# Patient Record
Sex: FEMALE | Race: WHITE | NOT HISPANIC OR LATINO | Employment: OTHER | URBAN - METROPOLITAN AREA
[De-identification: names, ages, dates, MRNs, and addresses within clinical notes are randomized per-mention and may not be internally consistent; named-entity substitution may affect disease eponyms.]

---

## 2024-08-10 ENCOUNTER — HOSPITAL ENCOUNTER (EMERGENCY)
Facility: HOSPITAL | Age: 89
Discharge: HOME | End: 2024-08-10
Attending: EMERGENCY MEDICINE | Admitting: EMERGENCY MEDICINE
Payer: MEDICARE

## 2024-08-10 ENCOUNTER — APPOINTMENT (EMERGENCY)
Dept: CT IMAGING | Facility: HOSPITAL | Age: 89
End: 2024-08-10
Payer: MEDICARE

## 2024-08-10 VITALS
OXYGEN SATURATION: 93 % | WEIGHT: 151 LBS | SYSTOLIC BLOOD PRESSURE: 131 MMHG | HEART RATE: 71 BPM | DIASTOLIC BLOOD PRESSURE: 79 MMHG | TEMPERATURE: 98 F | RESPIRATION RATE: 18 BRPM

## 2024-08-10 DIAGNOSIS — S51.819A SKIN TEAR OF FOREARM WITHOUT COMPLICATION: ICD-10-CM
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
## 2024-08-10 PROCEDURE — 70450 CT HEAD/BRAIN W/O DYE: CPT

## 2024-08-10 PROCEDURE — 99284 EMERGENCY DEPT VISIT MOD MDM: CPT

## 2024-08-10 NOTE — ED PROVIDER NOTES
History  Chief Complaint   Patient presents with    Fall     Pt tripped and fell 2 days ago and c.o laceration to right arm, +BT, +head strike      88-year-old female presents ER for evaluation of fall.  Patient stated that she fell 2 days ago.  Patient has a skin tear to her right lateral forearm.  Patient lives with family who states that she was changing the dressing today and was gushing blood.  Patient did fall and hit her head she is on blood thinners.  No noted loss of consciousness.  Patient has full range of motion to her arm and only concern is the skin tear.  Patient neurovascularly intact, sensation equal bilaterally.    GENERAL APPEARANCE: Patient in no acute distress.  HEENT: NCAT; PERRL, EOMs intact; Mucous membranes moist  NECK / BACK: Nontender  CV: Regular rate and rhythm; no murmur/gallops/rubs appreciated.  CHEST / LUNGS: Clear to auscultation; no wheezes/rales/rhonci.  ABD: NABS; soft; non-distended; non-tender.  EXT: +2 pulses bilaterally upper & lower extremities; no edema.  NEURO: GCS 15; no focal neurologic deficits; neurovascularly intact.  SKIN: Warm, dry and well perfused; no rash; no jaundice.      History provided by:  Patient      None       Past Medical History:   Diagnosis Date    Hypertension     Stroke (HCC)        History reviewed. No pertinent surgical history.    History reviewed. No pertinent family history.  I have reviewed and agree with the history as documented.    E-Cigarette/Vaping     E-Cigarette/Vaping Substances     Social History     Tobacco Use    Smoking status: Never    Smokeless tobacco: Never       Review of Systems   Constitutional:  Negative for chills and fever.   HENT:  Negative for ear pain and sore throat.    Eyes:  Negative for pain and visual disturbance.   Respiratory:  Negative for cough and shortness of breath.    Cardiovascular:  Negative for chest pain and palpitations.   Gastrointestinal:  Negative for abdominal pain, diarrhea and vomiting.    Genitourinary:  Negative for dysuria and hematuria.   Musculoskeletal:  Negative for arthralgias and back pain.   Skin:  Positive for wound. Negative for color change and rash.   Neurological:  Positive for headaches. Negative for seizures and syncope.   All other systems reviewed and are negative.      Physical Exam  Physical Exam  Vitals and nursing note reviewed.   Constitutional:       General: She is not in acute distress.     Appearance: She is well-developed.   HENT:      Head: Normocephalic and atraumatic.      Right Ear: External ear normal.      Left Ear: External ear normal.   Eyes:      Conjunctiva/sclera: Conjunctivae normal.   Cardiovascular:      Rate and Rhythm: Normal rate and regular rhythm.      Pulses: Normal pulses.      Heart sounds: Normal heart sounds. No murmur heard.  Pulmonary:      Effort: Pulmonary effort is normal. No respiratory distress.      Breath sounds: Normal breath sounds.   Abdominal:      Palpations: Abdomen is soft.   Musculoskeletal:         General: No swelling.      Cervical back: Neck supple.   Skin:     General: Skin is warm and dry.      Capillary Refill: Capillary refill takes less than 2 seconds.          Neurological:      Mental Status: She is alert and oriented to person, place, and time. Mental status is at baseline.      Cranial Nerves: No cranial nerve deficit.      Sensory: No sensory deficit.      Motor: No weakness.      Coordination: Coordination normal.   Psychiatric:         Mood and Affect: Mood normal.         Behavior: Behavior normal.         Vital Signs  ED Triage Vitals [08/10/24 1214]   Temperature Pulse Respirations Blood Pressure SpO2   98 °F (36.7 °C) (!) 110 18 135/78 93 %      Temp Source Heart Rate Source Patient Position - Orthostatic VS BP Location FiO2 (%)   Tympanic Monitor Sitting Left arm --      Pain Score       --           Vitals:    08/10/24 1214   BP: 135/78   Pulse: (!) 110   Patient Position - Orthostatic VS: Sitting          Visual Acuity  Visual Acuity      Flowsheet Row Most Recent Value   L Pupil Size (mm) 3   R Pupil Size (mm) 3            ED Medications  Medications - No data to display    Diagnostic Studies  Results Reviewed       None                   CT head wo contrast    (Results Pending)              Procedures  Procedures         ED Course  ED Course as of 08/10/24 1344   Sat Aug 10, 2024   1319 CT head wo contrast  No acute intracranial abnormality.                                                    Medical Decision Making  Given workup, exam and history low suspicion for intracranial hemorrhage or trauma, with carotid or vertebral artery dissection, intrathoracic trauma(pulmonary contusion, blunt cardiac trauma, pneumothorax, hemothorax), intra-abdominal trauma (no liver, spleen or renal lacerations, doubt hollow visceral injury), extremity fracture, extremity dislocation, compartment syndrome.  CT head negative.  Skin tear dressed and wound care discussed of right forearm.  Advised to follow-up with primary care provider.  Return to the ER symptoms worsens or questions or concerns arise at home.      Amount and/or Complexity of Data Reviewed  Radiology: ordered. Decision-making details documented in ED Course.                 Disposition  Final diagnoses:   None     ED Disposition       None          Follow-up Information    None         Patient's Medications    No medications on file       No discharge procedures on file.    PDMP Review       None            ED Provider  Electronically Signed by             BERKLEY Carey  08/10/24 0809

## 2024-08-10 NOTE — DISCHARGE INSTRUCTIONS
Keep wound clean and dry  Steri-strips will come off on its own  Follow up with primary care provider as needed  Return to ER if symptoms worsen

## 2025-01-20 ENCOUNTER — APPOINTMENT (OUTPATIENT)
Dept: RADIOLOGY | Facility: HOSPITAL | Age: 89
DRG: 072 | End: 2025-01-20
Payer: MEDICARE

## 2025-01-20 ENCOUNTER — APPOINTMENT (EMERGENCY)
Dept: CT IMAGING | Facility: HOSPITAL | Age: 89
DRG: 072 | End: 2025-01-20
Payer: MEDICARE

## 2025-01-20 ENCOUNTER — HOSPITAL ENCOUNTER (INPATIENT)
Facility: HOSPITAL | Age: 89
LOS: 3 days | Discharge: NURSING, REHAB OR LONG-TERM CARE | DRG: 072 | End: 2025-01-24
Attending: EMERGENCY MEDICINE | Admitting: INTERNAL MEDICINE
Payer: MEDICARE

## 2025-01-20 DIAGNOSIS — R51.9 ACUTE HEADACHE: ICD-10-CM

## 2025-01-20 DIAGNOSIS — R42 DIZZINESS: Primary | ICD-10-CM

## 2025-01-20 DIAGNOSIS — R29.90 STROKE-LIKE SYMPTOMS: ICD-10-CM

## 2025-01-20 DIAGNOSIS — R07.9 CHEST PAIN: ICD-10-CM

## 2025-01-20 PROBLEM — E87.6 HYPOKALEMIA: Status: ACTIVE | Noted: 2025-01-20

## 2025-01-20 PROBLEM — F32.9 MDD (MAJOR DEPRESSIVE DISORDER): Status: ACTIVE | Noted: 2025-01-20

## 2025-01-20 PROBLEM — I10 HTN (HYPERTENSION): Status: ACTIVE | Noted: 2025-01-20

## 2025-01-20 LAB
ANION GAP SERPL CALCULATED.3IONS-SCNC: 6 MMOL/L (ref 4–13)
APTT PPP: 24 SECONDS (ref 23–34)
BUN SERPL-MCNC: 14 MG/DL (ref 5–25)
CALCIUM SERPL-MCNC: 8.3 MG/DL (ref 8.4–10.2)
CARDIAC TROPONIN I PNL SERPL HS: 5 NG/L (ref ?–50)
CHLORIDE SERPL-SCNC: 105 MMOL/L (ref 96–108)
CO2 SERPL-SCNC: 26 MMOL/L (ref 21–32)
CREAT SERPL-MCNC: 0.48 MG/DL (ref 0.6–1.3)
ERYTHROCYTE [DISTWIDTH] IN BLOOD BY AUTOMATED COUNT: 13.2 % (ref 11.6–15.1)
GFR SERPL CREATININE-BSD FRML MDRD: 88 ML/MIN/1.73SQ M
GLUCOSE SERPL-MCNC: 115 MG/DL (ref 65–140)
GLUCOSE SERPL-MCNC: 122 MG/DL (ref 65–140)
HCT VFR BLD AUTO: 36.6 % (ref 34.8–46.1)
HGB BLD-MCNC: 12 G/DL (ref 11.5–15.4)
INR PPP: 1 (ref 0.85–1.19)
MCH RBC QN AUTO: 29.8 PG (ref 26.8–34.3)
MCHC RBC AUTO-ENTMCNC: 32.8 G/DL (ref 31.4–37.4)
MCV RBC AUTO: 91 FL (ref 82–98)
PLATELET # BLD AUTO: 217 THOUSANDS/UL (ref 149–390)
PMV BLD AUTO: 9.2 FL (ref 8.9–12.7)
POTASSIUM SERPL-SCNC: 3.4 MMOL/L (ref 3.5–5.3)
PROTHROMBIN TIME: 14 SECONDS (ref 12.3–15)
RBC # BLD AUTO: 4.03 MILLION/UL (ref 3.81–5.12)
SODIUM SERPL-SCNC: 137 MMOL/L (ref 135–147)
WBC # BLD AUTO: 7.3 THOUSAND/UL (ref 4.31–10.16)

## 2025-01-20 PROCEDURE — 84484 ASSAY OF TROPONIN QUANT: CPT | Performed by: EMERGENCY MEDICINE

## 2025-01-20 PROCEDURE — 96375 TX/PRO/DX INJ NEW DRUG ADDON: CPT

## 2025-01-20 PROCEDURE — 85730 THROMBOPLASTIN TIME PARTIAL: CPT | Performed by: EMERGENCY MEDICINE

## 2025-01-20 PROCEDURE — 99285 EMERGENCY DEPT VISIT HI MDM: CPT

## 2025-01-20 PROCEDURE — 93005 ELECTROCARDIOGRAM TRACING: CPT

## 2025-01-20 PROCEDURE — 82948 REAGENT STRIP/BLOOD GLUCOSE: CPT

## 2025-01-20 PROCEDURE — 96361 HYDRATE IV INFUSION ADD-ON: CPT

## 2025-01-20 PROCEDURE — 71045 X-RAY EXAM CHEST 1 VIEW: CPT

## 2025-01-20 PROCEDURE — 80048 BASIC METABOLIC PNL TOTAL CA: CPT | Performed by: EMERGENCY MEDICINE

## 2025-01-20 PROCEDURE — 70498 CT ANGIOGRAPHY NECK: CPT

## 2025-01-20 PROCEDURE — 85027 COMPLETE CBC AUTOMATED: CPT | Performed by: EMERGENCY MEDICINE

## 2025-01-20 PROCEDURE — 70496 CT ANGIOGRAPHY HEAD: CPT

## 2025-01-20 PROCEDURE — 36415 COLL VENOUS BLD VENIPUNCTURE: CPT | Performed by: EMERGENCY MEDICINE

## 2025-01-20 PROCEDURE — 99223 1ST HOSP IP/OBS HIGH 75: CPT | Performed by: STUDENT IN AN ORGANIZED HEALTH CARE EDUCATION/TRAINING PROGRAM

## 2025-01-20 PROCEDURE — 99285 EMERGENCY DEPT VISIT HI MDM: CPT | Performed by: EMERGENCY MEDICINE

## 2025-01-20 PROCEDURE — 85610 PROTHROMBIN TIME: CPT | Performed by: EMERGENCY MEDICINE

## 2025-01-20 PROCEDURE — 96374 THER/PROPH/DIAG INJ IV PUSH: CPT

## 2025-01-20 RX ORDER — ATORVASTATIN CALCIUM 40 MG/1
40 TABLET, FILM COATED ORAL EVERY EVENING
Status: DISCONTINUED | OUTPATIENT
Start: 2025-01-20 | End: 2025-01-20

## 2025-01-20 RX ORDER — POTASSIUM CHLORIDE 1500 MG/1
20 TABLET, EXTENDED RELEASE ORAL ONCE
Status: DISCONTINUED | OUTPATIENT
Start: 2025-01-20 | End: 2025-01-20

## 2025-01-20 RX ORDER — FLUOXETINE 10 MG/1
10 CAPSULE ORAL DAILY
COMMUNITY

## 2025-01-20 RX ORDER — ATORVASTATIN CALCIUM 40 MG/1
40 TABLET, FILM COATED ORAL DAILY
Status: DISCONTINUED | OUTPATIENT
Start: 2025-01-21 | End: 2025-01-24 | Stop reason: HOSPADM

## 2025-01-20 RX ORDER — CLOPIDOGREL BISULFATE 75 MG/1
75 TABLET ORAL DAILY
COMMUNITY

## 2025-01-20 RX ORDER — ATORVASTATIN CALCIUM 40 MG/1
40 TABLET, FILM COATED ORAL DAILY
COMMUNITY

## 2025-01-20 RX ORDER — ACETAMINOPHEN 325 MG/1
975 TABLET ORAL ONCE
Status: DISCONTINUED | OUTPATIENT
Start: 2025-01-20 | End: 2025-01-20

## 2025-01-20 RX ORDER — ACETAMINOPHEN 10 MG/ML
1000 INJECTION, SOLUTION INTRAVENOUS EVERY 6 HOURS PRN
Status: DISCONTINUED | OUTPATIENT
Start: 2025-01-20 | End: 2025-01-23

## 2025-01-20 RX ORDER — FENTANYL CITRATE 50 UG/ML
50 INJECTION, SOLUTION INTRAMUSCULAR; INTRAVENOUS ONCE
Refills: 0 | Status: COMPLETED | OUTPATIENT
Start: 2025-01-20 | End: 2025-01-20

## 2025-01-20 RX ORDER — MULTIVITAMIN
1 TABLET ORAL DAILY
COMMUNITY

## 2025-01-20 RX ORDER — MECLIZINE HCL 12.5 MG 12.5 MG/1
12.5 TABLET ORAL ONCE
Status: COMPLETED | OUTPATIENT
Start: 2025-01-20 | End: 2025-01-20

## 2025-01-20 RX ORDER — CLOPIDOGREL BISULFATE 75 MG/1
75 TABLET ORAL DAILY
Status: DISCONTINUED | OUTPATIENT
Start: 2025-01-21 | End: 2025-01-24 | Stop reason: HOSPADM

## 2025-01-20 RX ORDER — ASPIRIN 325 MG
325 TABLET ORAL ONCE
Status: COMPLETED | OUTPATIENT
Start: 2025-01-20 | End: 2025-01-20

## 2025-01-20 RX ORDER — METOCLOPRAMIDE HYDROCHLORIDE 5 MG/ML
10 INJECTION INTRAMUSCULAR; INTRAVENOUS ONCE
Status: COMPLETED | OUTPATIENT
Start: 2025-01-20 | End: 2025-01-20

## 2025-01-20 RX ORDER — LIDOCAINE 50 MG/G
1 PATCH TOPICAL DAILY
Status: DISCONTINUED | OUTPATIENT
Start: 2025-01-20 | End: 2025-01-21

## 2025-01-20 RX ORDER — AMLODIPINE BESYLATE 5 MG/1
5 TABLET ORAL DAILY
COMMUNITY

## 2025-01-20 RX ORDER — ENOXAPARIN SODIUM 100 MG/ML
40 INJECTION SUBCUTANEOUS DAILY
Status: DISCONTINUED | OUTPATIENT
Start: 2025-01-21 | End: 2025-01-24 | Stop reason: HOSPADM

## 2025-01-20 RX ADMIN — LIDOCAINE 1 PATCH: 50 PATCH CUTANEOUS at 22:12

## 2025-01-20 RX ADMIN — SODIUM CHLORIDE 500 ML: 0.9 INJECTION, SOLUTION INTRAVENOUS at 18:33

## 2025-01-20 RX ADMIN — ASPIRIN 325 MG ORAL TABLET 325 MG: 325 PILL ORAL at 19:14

## 2025-01-20 RX ADMIN — MECLIZINE HCL 12.5 MG 12.5 MG: 12.5 TABLET ORAL at 18:27

## 2025-01-20 RX ADMIN — FENTANYL CITRATE 50 MCG: 0.05 INJECTION, SOLUTION INTRAMUSCULAR; INTRAVENOUS at 18:45

## 2025-01-20 RX ADMIN — METOCLOPRAMIDE HYDROCHLORIDE 10 MG: 5 INJECTION INTRAMUSCULAR; INTRAVENOUS at 18:27

## 2025-01-20 NOTE — QUICK NOTE
Neurology Stroke Alert Documentation    Stroke alert called at 1747 (10-15 minutes pre-hospital), neurology response was immediate. Discussed case with ED over phone. History and examination per ED.    Ibeth Burrell is an 89 year old woman who presented to the hospital for evaluation of dizziness and N/V. Around 1630, pt began to have dizziness in addition to N/V and chest pain. Symptoms were persistent, which is why she came to the hospital for evaluation. Has chronic RUE weakness from a prior stroke, at baseline. On Plavix at home. No other noted symptoms. Reported to have significant improvement in symptoms after return from CT scan.   - NIHSS 1 (has residual RUE drift from prior stroke, at baseline)  - LKW 1630    Discussed with Radiology:  CTH: no acute intracranial abnormality  CTA H/N: no LVO    TNK deferred given much improvement with mild/non-disabling symptoms, with potential risks outweighing benefits.    Not an endovascular candidate with no LVO/IR target.    Pt with much improvement in symptoms after return from CT scan. Noted to have mild RUE drift which is her baseline. Symptoms may potentially be peripheral in nature but in light of risk factors for stroke, would recommend admission for MRI brain and further evaluation. Load with aspirin 325 mg once. Start aspirin 81 mg daily and Plavix 75 mg daily starting tomorrow for now pending further workup. Can allow for systolic BP up to 200 systolic for now. Stroke pathway.

## 2025-01-21 ENCOUNTER — APPOINTMENT (INPATIENT)
Dept: MRI IMAGING | Facility: HOSPITAL | Age: 89
DRG: 072 | End: 2025-01-21
Payer: MEDICARE

## 2025-01-21 ENCOUNTER — APPOINTMENT (OUTPATIENT)
Dept: NON INVASIVE DIAGNOSTICS | Facility: HOSPITAL | Age: 89
DRG: 072 | End: 2025-01-21
Payer: MEDICARE

## 2025-01-21 LAB
ANION GAP SERPL CALCULATED.3IONS-SCNC: 6 MMOL/L (ref 4–13)
AORTIC ROOT: 3.9 CM
AORTIC VALVE MEAN VELOCITY: 19.8 M/S
ASCENDING AORTA: 4.2 CM
ATRIAL RATE: 62 BPM
ATRIAL RATE: 63 BPM
ATRIAL RATE: 65 BPM
AV AREA BY CONTINUOUS VTI: 2.9 CM2
AV AREA PEAK VELOCITY: 2.5 CM2
AV LVOT MEAN GRADIENT: 5 MMHG
AV LVOT PEAK GRADIENT: 8 MMHG
AV MEAN PRESS GRAD SYS DOP V1V2: 18 MMHG
AV ORIFICE AREA US: 2.9 CM2
AV PEAK GRADIENT: 30 MMHG
AV REGURGITATION PRESSURE HALF TIME: 385 MS
AV VELOCITY RATIO: 0.59
BASOPHILS # BLD AUTO: 0.03 THOUSANDS/ΜL (ref 0–0.1)
BASOPHILS NFR BLD AUTO: 0 % (ref 0–1)
BSA FOR ECHO PROCEDURE: 1.75 M2
BUN SERPL-MCNC: 14 MG/DL (ref 5–25)
CALCIUM SERPL-MCNC: 8.7 MG/DL (ref 8.4–10.2)
CHLORIDE SERPL-SCNC: 109 MMOL/L (ref 96–108)
CHOLEST SERPL-MCNC: 131 MG/DL (ref ?–200)
CO2 SERPL-SCNC: 26 MMOL/L (ref 21–32)
CREAT SERPL-MCNC: 0.42 MG/DL (ref 0.6–1.3)
DOP CALC AO VTI: 65.7 CM
DOP CALC LVOT AREA: 4.91
DOP CALC LVOT DIAMETER: 2.5 CM
DOP CALC LVOT PEAK VEL VTI: 38.8 CM
DOP CALC LVOT PEAK VEL: 1.39 M/S
DOP CALC LVOT STROKE INDEX: 109.1 ML/M2
DOP CALC LVOT STROKE VOLUME: 190.36
DOP CALC MV VTI: 45.5 CM
E WAVE DECELERATION TIME: 454 MS
E/A RATIO: 0.61
EOSINOPHIL # BLD AUTO: 0.25 THOUSAND/ΜL (ref 0–0.61)
EOSINOPHIL NFR BLD AUTO: 3 % (ref 0–6)
ERYTHROCYTE [DISTWIDTH] IN BLOOD BY AUTOMATED COUNT: 13 % (ref 11.6–15.1)
EST. AVERAGE GLUCOSE BLD GHB EST-MCNC: 108 MG/DL
FRACTIONAL SHORTENING: 35 (ref 28–44)
GFR SERPL CREATININE-BSD FRML MDRD: 92 ML/MIN/1.73SQ M
GLUCOSE P FAST SERPL-MCNC: 98 MG/DL (ref 65–99)
GLUCOSE SERPL-MCNC: 98 MG/DL (ref 65–140)
HBA1C MFR BLD: 5.4 %
HCT VFR BLD AUTO: 35.8 % (ref 34.8–46.1)
HDLC SERPL-MCNC: 48 MG/DL
HGB BLD-MCNC: 12 G/DL (ref 11.5–15.4)
IMM GRANULOCYTES # BLD AUTO: 0.02 THOUSAND/UL (ref 0–0.2)
IMM GRANULOCYTES NFR BLD AUTO: 0 % (ref 0–2)
INTERVENTRICULAR SEPTUM IN DIASTOLE (PARASTERNAL SHORT AXIS VIEW): 1.6 CM
INTERVENTRICULAR SEPTUM: 1.6 CM (ref 0.6–1.1)
LA/AORTA RATIO 2D: 1.44
LAAS-AP2: 17.7 CM2
LAAS-AP4: 22.1 CM2
LDLC SERPL CALC-MCNC: 63 MG/DL (ref 0–100)
LEFT ATRIUM SIZE: 5.6 CM
LEFT ATRIUM VOLUME (MOD BIPLANE): 58 ML
LEFT ATRIUM VOLUME INDEX (MOD BIPLANE): 33.1 ML/M2
LEFT INTERNAL DIMENSION IN SYSTOLE: 2.4 CM (ref 2.1–4)
LEFT VENTRICULAR INTERNAL DIMENSION IN DIASTOLE: 3.7 CM (ref 3.5–6)
LEFT VENTRICULAR POSTERIOR WALL IN END DIASTOLE: 1.1 CM
LEFT VENTRICULAR STROKE VOLUME: 36 ML
LV EF US.2D.A4C+ESTIMATED: 62 %
LVSV (TEICH): 36 ML
LYMPHOCYTES # BLD AUTO: 1.33 THOUSANDS/ΜL (ref 0.6–4.47)
LYMPHOCYTES NFR BLD AUTO: 18 % (ref 14–44)
MAGNESIUM SERPL-MCNC: 1.4 MG/DL (ref 1.9–2.7)
MCH RBC QN AUTO: 30.1 PG (ref 26.8–34.3)
MCHC RBC AUTO-ENTMCNC: 33.5 G/DL (ref 31.4–37.4)
MCV RBC AUTO: 90 FL (ref 82–98)
MONOCYTES # BLD AUTO: 0.68 THOUSAND/ΜL (ref 0.17–1.22)
MONOCYTES NFR BLD AUTO: 9 % (ref 4–12)
MV E'TISSUE VEL-SEP: 4 CM/S
MV MEAN GRADIENT: 3 MMHG
MV PEAK A VEL: 1.63 M/S
MV PEAK E VEL: 99 CM/S
MV PEAK GRADIENT: 10 MMHG
MV STENOSIS PRESSURE HALF TIME: 130 MS
MV VALVE AREA BY CONTINUITY EQUATION: 4.18 CM2
MV VALVE AREA P 1/2 METHOD: 1.69
NEUTROPHILS # BLD AUTO: 5.14 THOUSANDS/ΜL (ref 1.85–7.62)
NEUTS SEG NFR BLD AUTO: 70 % (ref 43–75)
NRBC BLD AUTO-RTO: 0 /100 WBCS
P AXIS: 50 DEGREES
P AXIS: 55 DEGREES
P AXIS: 56 DEGREES
PLATELET # BLD AUTO: 183 THOUSANDS/UL (ref 149–390)
PMV BLD AUTO: 9.2 FL (ref 8.9–12.7)
POTASSIUM SERPL-SCNC: 3.5 MMOL/L (ref 3.5–5.3)
PR INTERVAL: 148 MS
PR INTERVAL: 150 MS
PR INTERVAL: 156 MS
QRS AXIS: 34 DEGREES
QRS AXIS: 62 DEGREES
QRS AXIS: 66 DEGREES
QRSD INTERVAL: 80 MS
QRSD INTERVAL: 82 MS
QRSD INTERVAL: 90 MS
QT INTERVAL: 452 MS
QT INTERVAL: 462 MS
QT INTERVAL: 462 MS
QTC INTERVAL: 468 MS
QTC INTERVAL: 470 MS
QTC INTERVAL: 472 MS
RBC # BLD AUTO: 3.99 MILLION/UL (ref 3.81–5.12)
RIGHT VENTRICLE ID DIMENSION: 3 CM
SL CV AV PEAK GRADIENT RETROGRADE: 41 MMHG
SL CV ECHO LV DYNAMIC OBSTRUCTION PEAK GRADIENT (REST): 9 MMHG
SL CV LV EF: 60
SL CV PED ECHO LEFT VENTRICLE DIASTOLIC VOLUME (MOD BIPLANE) 2D: 57 ML
SL CV PED ECHO LEFT VENTRICLE SYSTOLIC VOLUME (MOD BIPLANE) 2D: 20 ML
SODIUM SERPL-SCNC: 141 MMOL/L (ref 135–147)
T WAVE AXIS: 63 DEGREES
T WAVE AXIS: 77 DEGREES
T WAVE AXIS: 82 DEGREES
TR MAX PG: 25 MMHG
TR PEAK VELOCITY: 2.5 M/S
TRICUSPID ANNULAR PLANE SYSTOLIC EXCURSION: 2.1 CM
TRICUSPID VALVE PEAK REGURGITATION VELOCITY: 2.48 M/S
TRIGL SERPL-MCNC: 102 MG/DL (ref ?–150)
VENTRICULAR RATE: 62 BPM
VENTRICULAR RATE: 63 BPM
VENTRICULAR RATE: 65 BPM
WBC # BLD AUTO: 7.45 THOUSAND/UL (ref 4.31–10.16)

## 2025-01-21 PROCEDURE — 97110 THERAPEUTIC EXERCISES: CPT

## 2025-01-21 PROCEDURE — 93306 TTE W/DOPPLER COMPLETE: CPT | Performed by: STUDENT IN AN ORGANIZED HEALTH CARE EDUCATION/TRAINING PROGRAM

## 2025-01-21 PROCEDURE — 83036 HEMOGLOBIN GLYCOSYLATED A1C: CPT | Performed by: STUDENT IN AN ORGANIZED HEALTH CARE EDUCATION/TRAINING PROGRAM

## 2025-01-21 PROCEDURE — 97162 PT EVAL MOD COMPLEX 30 MIN: CPT

## 2025-01-21 PROCEDURE — 93010 ELECTROCARDIOGRAM REPORT: CPT | Performed by: INTERNAL MEDICINE

## 2025-01-21 PROCEDURE — 97167 OT EVAL HIGH COMPLEX 60 MIN: CPT

## 2025-01-21 PROCEDURE — 80061 LIPID PANEL: CPT | Performed by: STUDENT IN AN ORGANIZED HEALTH CARE EDUCATION/TRAINING PROGRAM

## 2025-01-21 PROCEDURE — 70551 MRI BRAIN STEM W/O DYE: CPT

## 2025-01-21 PROCEDURE — 83735 ASSAY OF MAGNESIUM: CPT | Performed by: STUDENT IN AN ORGANIZED HEALTH CARE EDUCATION/TRAINING PROGRAM

## 2025-01-21 PROCEDURE — 99232 SBSQ HOSP IP/OBS MODERATE 35: CPT | Performed by: PHYSICIAN ASSISTANT

## 2025-01-21 PROCEDURE — 85025 COMPLETE CBC W/AUTO DIFF WBC: CPT | Performed by: STUDENT IN AN ORGANIZED HEALTH CARE EDUCATION/TRAINING PROGRAM

## 2025-01-21 PROCEDURE — 93306 TTE W/DOPPLER COMPLETE: CPT

## 2025-01-21 PROCEDURE — 80048 BASIC METABOLIC PNL TOTAL CA: CPT | Performed by: STUDENT IN AN ORGANIZED HEALTH CARE EDUCATION/TRAINING PROGRAM

## 2025-01-21 PROCEDURE — 99223 1ST HOSP IP/OBS HIGH 75: CPT | Performed by: PSYCHIATRY & NEUROLOGY

## 2025-01-21 PROCEDURE — 92610 EVALUATE SWALLOWING FUNCTION: CPT

## 2025-01-21 RX ORDER — ACETAMINOPHEN 325 MG/1
650 TABLET ORAL EVERY 6 HOURS PRN
Status: DISCONTINUED | OUTPATIENT
Start: 2025-01-21 | End: 2025-01-23

## 2025-01-21 RX ORDER — POLYETHYLENE GLYCOL 3350 17 G/17G
17 POWDER, FOR SOLUTION ORAL DAILY PRN
Status: DISCONTINUED | OUTPATIENT
Start: 2025-01-21 | End: 2025-01-24 | Stop reason: HOSPADM

## 2025-01-21 RX ORDER — ONDANSETRON 2 MG/ML
4 INJECTION INTRAMUSCULAR; INTRAVENOUS EVERY 4 HOURS PRN
Status: DISCONTINUED | OUTPATIENT
Start: 2025-01-21 | End: 2025-01-24 | Stop reason: HOSPADM

## 2025-01-21 RX ORDER — MECLIZINE HYDROCHLORIDE 25 MG/1
25 TABLET ORAL EVERY 8 HOURS PRN
Status: DISCONTINUED | OUTPATIENT
Start: 2025-01-21 | End: 2025-01-24 | Stop reason: HOSPADM

## 2025-01-21 RX ADMIN — ACETAMINOPHEN 1000 MG: 1000 INJECTION, SOLUTION INTRAVENOUS at 19:57

## 2025-01-21 RX ADMIN — DICLOFENAC SODIUM 2 G: 10 GEL TOPICAL at 17:37

## 2025-01-21 RX ADMIN — DICLOFENAC SODIUM 2 G: 10 GEL TOPICAL at 15:00

## 2025-01-21 RX ADMIN — ATORVASTATIN CALCIUM 40 MG: 40 TABLET, FILM COATED ORAL at 10:47

## 2025-01-21 RX ADMIN — LIDOCAINE 1 PATCH: 50 PATCH CUTANEOUS at 10:47

## 2025-01-21 RX ADMIN — ACETAMINOPHEN 1000 MG: 1000 INJECTION, SOLUTION INTRAVENOUS at 10:52

## 2025-01-21 RX ADMIN — ACETAMINOPHEN 1000 MG: 1000 INJECTION, SOLUTION INTRAVENOUS at 01:02

## 2025-01-21 RX ADMIN — CLOPIDOGREL 75 MG: 75 TABLET ORAL at 10:47

## 2025-01-21 RX ADMIN — DICLOFENAC SODIUM 2 G: 10 GEL TOPICAL at 21:32

## 2025-01-21 RX ADMIN — ENOXAPARIN SODIUM 40 MG: 40 INJECTION SUBCUTANEOUS at 10:47

## 2025-01-21 NOTE — SPEECH THERAPY NOTE
Speech-Language Pathology Bedside Swallow Evaluation        Patient Name: Ibeth Burrell    Today's Date: 1/21/2025     Problem List  Principal Problem:    Stroke-like symptoms  Active Problems:    HTN (hypertension)    MDD (major depressive disorder)    Hypokalemia    Chest pain         Past Medical History  Past Medical History:   Diagnosis Date    Stroke (HCC)        Past Surgical History  No past surgical history on file.    Summary/Impressions:   Bedside observations support grossly intact oropharyngeal swallow function across all consistencies tested.  Self-fed solid and liquid trials with no s/s of aspiration or distress.  Recalls coughing when drinking thin liquids last evening.  Able to manage liquids from straw during this encounter.  Noted ill fitting dentures, however pt denies difficulties and appears to compensate well.  Agreeable to choose softer options as needed.     Recommendations:   Diet: regular diet and thin liquids - choose softer  Meds: whole with liquid 1 at a time   Feeding assistance: tray set up   Frequent Oral care: 2-4x/day  Aspiration precautions and compensatory swallowing strategies: upright posture, only feed when fully alert, slow rate of feeding, and small bites/sips  Other Recommendations/ considerations: No further ST follow-up; please re-consult should pt status change or concerns arise.      Current Medical Status  Pt is a 89 y.o. female who presented to [facility] with stroke-like symptoms.  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 She presented with dizziness at home and near syncope where she almost fell but her family caught her.   Patient failed RN dysphagia screen 2/2 coughing w/ thins via straw.  NPO with orders for formal evaluation.     Past medical history:  Please see H&P for details    Special Studies:  1/20/25 Chest XR:  Mild right base opacity which could be due to atelectasis. Pneumonia not excluded in the  appropriate clinical setting.     CTA stroke alert 1/20/25:  Moderate to high-grade stenosis involving the right posterior artery P2 segment, and mild stenosis of the P2 segment, mild stenosis of the distal right M1 segment and mild stenosis of a left middle cerebral artery proximal M2 segment due to   atherosclerotic plaque. No large vessel occlusion or intracranial aneurysm identified.  No hemodynamically significant stenosis, dissection or occlusion identified on CT angiogram of the neck.    Social/Education/Vocational Hx:  Pt lives with family    Swallow Information   Current Risks for Dysphagia & Aspiration: CVA  Current Symptoms/Concerns: coughing with po  Current Diet: NPO   Baseline Diet: regular diet and thin liquids    Baseline Assessment   Behavior/Cognition: alert  Speech/Language Status: able to participate in conversation  Patient Positioning: upright in bed    Swallow Mechanism Exam   Facial: symmetrical  Labial: WFL  Lingual: WFL (subtle R-side deviation)   Velum: unable to visualize  Mandible: adequate ROM  Dentition: full dentures- ill fitting (x40 years old per pt report)  Vocal quality:clear/adequate   Volitional Cough: strong/productive   Respiratory: 2L NC    Consistencies Assessed and Performance   Consistencies Administered: thin liquids, puree, soft solids, and hard solids- this via cup sip and straw    Oral Stage: Patient presents with adequate bolus acceptance, containment and manipulation.  Mastication judged to be prolonged though complete for small bites.  Mild oral residue. Benefits from sips of liquids to clear.  Suspect dental status to be contributing factor.     Pharyngeal Stage: Appears functional.  Laryngeal rise noted upon palpation.  Swallow initiation appears timely.  No overt s/s of aspiration or distress.  Vocal quality remains clear and dry.     Esophageal Concerns: none reported     Plan  No additional follow-up at this time. Please re-order should pt exhibit change in  status or concerns arise.       Sabrina Wei MS, CCC-SLP  Speech-Language Pathologist  [place] #[id number]  [place] #[id number]

## 2025-01-21 NOTE — ASSESSMENT & PLAN NOTE
"Patient presented to the ED as a prehospital stroke alert.   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  She presented with dizziness at home and near syncope where she almost fell but her family caught her. They reported her \"eyes rolled back\" prior to this.  NIH was 1 on arrival with residual right UE deficit noted.  Neurology evaluated the patient and recommended aspirin load and continuing aspirin 81 mg and Plavix 75 mg daily starting tomorrow.  She will be admitted for stroke pathway  Continue statin  MRI, echo, lipids, A1c  Neuro checks per protocol  Neurology consulted, tele note reviewed  She failed nursing bedside swallow evaluation, will need official speech evaluation tomorrow before diet or meds   Permissive HTN up to 200, hold amlodipine  "

## 2025-01-21 NOTE — OCCUPATIONAL THERAPY NOTE
Occupational Therapy Evaluation        Patient Name: Ibeth Burrell  Today's Date: 1/21/2025 01/21/25 1414   OT Last Visit   OT Visit Date 01/21/25   Note Type   Note type Evaluation   Pain Assessment   Pain Assessment Tool 0-10   Pain Score 8   Pain Location/Orientation Location: Chest   Pain Onset/Description Onset: Ongoing   Hospital Pain Intervention(s) Repositioned;Ambulation/increased activity   Restrictions/Precautions   Weight Bearing Precautions Per Order No   Braces or Orthoses Other (Comment)  (none reported)   Other Precautions Chair Alarm;Bed Alarm;Telemetry;Fall Risk   Home Living   Type of Home House   Home Layout One level;Able to live on main level with bedroom/bathroom;Performs ADLs on one level;Stairs to enter with rails  (4 HOLLI)   Bathroom Shower/Tub Tub/shower unit   Bathroom Toilet Standard   Bathroom Equipment Shower chair   Bathroom Accessibility Accessible   Home Equipment Walker  (rollator)   Additional Comments Pt ambulates w/ a rollator at baseline   Prior Function   Level of [place] Independent with ADLs;Independent with functional mobility;Needs assistance with IADLS   Lives With Daughter;Family   Receives Help From Family   IADLs Family/Friend/Other provides transportation;Family/Friend/Other provides meals;Family/Friend/Other provides medication management   Falls in the last 6 months 1 to 4  (2 falls)   Vocational Retired   Lifestyle   Autonomy Pt reported residing in a one-story house with 4 HOLLI with family. Pt reported being indpenedent with ADLs and requiring assistance for IADLs. Pt reported ambulating with a rollator at baseline.   Reciprocal Relationships Supportive Family   General   Family/Caregiver Present No   ADL   Where Assessed Chair  (and Bathroom)   Eating Assistance 5  Supervision/Setup   Grooming Assistance 4  Minimal Assistance   Grooming Deficit Setup;Verbal cueing;Supervision/safety;Increased time to complete;Wash/dry hands   UB Bathing  Assistance 4  Minimal Assistance   LB Bathing Assistance 2  Maximal Assistance   UB Dressing Assistance 4  Minimal Assistance   UB Dressing Deficit Setup;Verbal cueing;Supervision/safety;Increased time to complete;Thread RUE;Thread LUE   LB Dressing Assistance 2  Maximal Assistance   LB Dressing Deficit Setup;Verbal cueing;Supervision/safety;Increased time to complete;Don/doff R sock;Don/doff L sock;Thread RLE into underwear;Thread LLE into underwear   Toileting Assistance  4  Minimal Assistance   Toileting Deficit Setup;Verbal cueing;Supervison/safety;Increased time to complete;Clothing management up;Clothing management down;Perineal hygiene   Functional Assistance 4  Minimal Assistance   Functional Deficit Setup;Supervision/safety;Increased time to complete;Toilet transfer   Bed Mobility   Supine to Sit 4  Minimal assistance   Additional items Assist x 1;HOB elevated;Bedrails;Increased time required;Verbal cues   Additional Comments Pt reported dizziness upon sitting EOB and during transitional movements, requiring ~1-2 minute rest break. RN aware.   Transfers   Sit to Stand 4  Minimal assistance   Additional items Assist x 1;Increased time required;Verbal cues   Stand to Sit 4  Minimal assistance   Additional items Assist x 1;Armrests;Increased time required;Verbal cues   Additional Comments w/ RW   Functional Mobility   Functional Mobility 4  Minimal assistance   Additional Comments assist x1 with RW; verbal cueing required for proper hand placement and body mechanics when ambulating into bathroom, to chair, and back to bed. Pt reported dizziness upon transitional movements requiring ~1-2 minute rest break after each tx. Pt reported the room was spinning during periods of feeling dizziness. RN aware.   Additional items Rolling walker   Balance   Static Sitting Fair +   Dynamic Sitting Fair   Static Standing Fair -   Dynamic Standing Poor +   Activity Tolerance   Activity Tolerance Patient limited by fatigue    Nurse Made Aware ARAM Jackson aware.   RUE Assessment   RUE Assessment X  (2/5 R UE; chronic R sided weakness from prior CVA)   LUE Assessment   LUE Assessment WFL  (4+/5 L UE)   Hand Function   Gross Motor Coordination Functional   Fine Motor Coordination Functional   Hand Function Comments R UE hand weakness; L UE hand WFL   Sensation   Light Touch No apparent deficits  (BUEs)   Vision-Basic Assessment   Current Vision Wears glasses only for reading   Vision - Complex Assessment   Ocular Range of Motion Intact   Tracking Intact   Saccades Intact   Acuity   (Pt unable to read normal print or employee name badge; pt reported that vision has been impaired PTA and requires reading glasses.)   Diplopia Assessment   (pt denies double vision)   Cognition   Overall Cognitive Status WFL   Arousal/Participation Alert;Responsive;Cooperative   Attention Within functional limits   Orientation Level Oriented to person;Oriented to place;Oriented to situation;Disoriented to time  (knows the month but not date or year)   Memory Within functional limits   Following Commands Follows multistep commands without difficulty   Assessment   Limitation Decreased ADL status;Decreased UE ROM;Decreased UE strength;Decreased Safe judgement during ADL;Decreased endurance;Decreased self-care trans;Decreased high-level ADLs   Prognosis Good   Assessment Patient is a 89 y.o. female seen for OT evaluation s/p admit to [facility] on 1/20/2025 w/Stroke-like symptoms. PMHx and Commorbidities affecting patient's functional performance at time of assessment include: hypertension, major depressive disorder, hypokalemia, and chest pain. Orders placed for OT evaluation and treatment.  Performed at least two patient identifiers during session including name and wristband. Pt reported residing in a one-story house with 4 HOLLI with family. Pt reported being indpenedent with ADLs and requiring assistance for IADLs. Pt reported ambulating with a  rollator at baseline. Personal factors affecting patient at time of initial evaluation include: difficulty performing ADLs and difficulty performing IADLs. Upon evaluation, patient requires minimal  assist for UB ADLs, maximal assist for LB ADLs, transfers and functional ambulation in room and bathroom with minimal  assist x1, with the use of Rolling Walker.  Patient is oriented to name,, oriented to place,, oriented to situation,, and disoriented to time,. Occupational performance is affected by the following deficits: dynamic sit/ stand balance deficit with poor standing tolerance time for self care and functional mobility, decreased activity tolerance, decreased safety awareness, and increased pain.  Patient to benefit from continued Occupational Therapy treatment while in the hospital to address deficits as defined above and maximize level of functional independence with ADLs and functional mobility. Occupational Performance areas to address include: eating, grooming , bathing/ shower, dressing, toilet hygiene, transfer to all surfaces, functional ambulation, and functional mobility. From OT standpoint, recommendation at time of d/c would be Level 3.   Plan   Treatment Interventions ADL retraining;Functional transfer training;UE strengthening/ROM;Endurance training;Compensatory technique education;Energy conservation;Activityengagement   Goal Expiration Date 02/04/25   OT Frequency 3-5x/wk   Discharge Recommendation   Rehab Resource Intensity Level, OT III (Minimum Resource Intensity)   AM-PAC Daily Activity Inpatient   Lower Body Dressing 2   Bathing 2   Toileting 2   Upper Body Dressing 3   Grooming 3   Eating 3   Daily Activity Raw Score 15   Daily Activity Standardized Score (Calc for Raw Score >=11) 34.69   AM-PAC Applied Cognition Inpatient   Following a Speech/Presentation 4   Understanding Ordinary Conversation 4   Taking Medications 3   Remembering Where Things Are Placed or Put Away 3   Remembering  List of 4-5 Errands 2   Taking Care of Complicated Tasks 2   Applied Cognition Raw Score 18   Applied Cognition Standardized Score 38.07   Barthel Index   Feeding 10   Bathing 0   Grooming Score 0   Dressing Score 5   Bladder Score 5   Bowels Score 10   Toilet Use Score 5   Transfers (Bed/Chair) Score 10   Mobility (Level Surface) Score 0   Stairs Score 0   Barthel Index Score 45   Modified [place] Scale   Modified [place]ette Scale 4   End of Consult   Patient Position at End of Consult Supine;Bed/Chair alarm activated;All needs within reach       Occupational therapy Goals: In 2-4 days    1- Patient will verbalize and demonstrate use of energy conservation/ deep breathing technique and work simplification skills during functional activity with no verbal cues.  2- Patient will verbalize and demonstrate good body mechanics and joint protection techniques during ADLs/ IADLs with no verbal cues.  3- Patient will increase OOB/ sitting tolerance to 4-6 hours per day for increased participation in self care and leisure tasks with no s/s of exertion.  4- Patient will identify s/s of exertion during ADL and functional mobility with no verbal cues.  5-Patient will verbalize/ demonstrate compensatory strategies to recover from exertion with no verbal cues.   6-Patient will increase standing tolerance time to 10 minutes with No UE support to complete sink level ADLs @ Mod I level   7- Patient will increase sitting tolerance at edge of bed to 30 minutes to complete UB ADLs @ Indep. level.       PAYTON Harris, OTR/L

## 2025-01-21 NOTE — CONSULTS
"Consultation - Neurology   Name: Ibeth Burrell 89 y.o. female I MRN: [id number]  Unit/Bed#: 2 E 267-01 I Date of Admission: 1/20/2025   Date of Service: 1/21/2025 I Hospital Day: 0   Consult to Neurology  Consult performed by: Mg Corral PA-C  Consult ordered by: Brendan Quick MD      Physician Requesting Evaluation: Alvin Ventura MD   Reason for Evaluation / Principal Problem: Stroke like symptoms.     Assessment & Plan  Stroke-like symptoms  Ibeth Burrell is an 89 year old woman with hx of prior stroke who presented to the hospital for evaluation of dizziness and N/V. Around 1630 on 1/20, pt began to have dizziness in addition to N/V and chest pain. Symptoms were persistent, which is why she came to the hospital for evaluation. 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 On Plavix at home. No other noted symptoms. Reported to have significant improvement in symptoms after return from CT scan.  She described her dizziness as room spinning.  No other reported focal neurological complaints or findings on examination.   - NIHSS 1 (has residual RUE drift from prior stroke, at baseline)  - LKW 1630  - The patient was not a tnk or thrombectomy candidate.     CT of head - \"No acute intracranial abnormality. Moderate chronic white matter microangiopathic changes. Old lacunar infarcts as described above. :  CTA of head and neck - \"Moderate to high-grade stenosis involving the right posterior artery P2 segment, and mild stenosis of the P2 segment, mild stenosis of the distal right M1 segment and mild stenosis of a left middle cerebral artery proximal M2 segment due to atherosclerotic plaque. No large vessel occlusion or intracranial aneurysm identified.  No hemodynamically significant stenosis, dissection or occlusion identified on CT angiogram of the neck.\"    Vital signs arrival temperature was 97.7 blood pressure was 158/87 SpO2 was 96.    Labs/Testing:  Glucose 115  BMP " with potassium of 3.4, calcium 8.3  CBC was normal  INR was normal  APTT were normal  Trop was normal  LDL was 63  A1c pending  Mg was 1.4L     Symptoms may potentially be peripheral in nature but in light of risk factors for stroke/hx of stoke, would recommend MRI brain and further evaluation     - Stroke pathway  MRI brain  Echo  LDL was 63  Hemoglobin A1c  Loaded with aspirin, currently on home Plavix.   Atorvastatin 40 mg  Continue telemetry  PT/OT/ST  Frequent neuro checks. Continue to monitor and notify neurology with any changes.   - Medical management and supportive care per primary team. Correction of any metabolic or infectious disturbances.    - Reviewed with attending plan of care per attending physician.  Please see attestation for completed details.   Acted as scribe in this case.     Ibeth Burrell will need follow up in in 4 weeks with neurovascular attending or advance practitioner. She will not require outpatient neurological testing.    History of Present Illness   Ibeth Burrell is a 89 y.o. with prior history of stroke (the patient takes Plavix at home/statin), chronic right upper extremity weakness from prior stroke, presents to [facility] on 1/20 for evaluation of dizziness/nausea vomiting.  Please see stroke alert quick note completed on 1/20 by neurology, it was reported the NIH was a 1.  The patient was not a TNK candidate, there was no endovascular target for thrombectomy.  It was recommend the patient be admitted for the stroke pathway, the patient was loaded with aspirin in the ED and continue on dual antiplatelet, but this may all be peripheral in etiology.   It was reported that her symptoms were much improved after return from CT scan, the right upper extremity drift is baseline.    Vital signs arrival temperature was 97.7 blood pressure was 158/87 SpO2 was 96.    Labs/Testing:  Glucose 115  BMP with potassium of 3.4, calcium 8.3  CBC was normal  INR was  normal  APTT were normal  Trop was normal  LDL was 63  A1c pending  Mg was 1.4L    She reports she felt as if she was going to pass out yesterday, she reports room spinning sensation, she has a long hx of spinning sensation.  She reports this is why she came to the hospital. She denies ha or new stroke like symptoms, she does report a hx of stroke.    She denies new one sided weakness, confusion, problems with her speech, she has chronic vision loss from macular degeneration, she has right upper extremity weakness from arthropathy and stroke (not worse), no reported ataxia.     She reports her dizziness improving, but persists, her biggest complaints is her chest pain for which the nurse is aware.    Review of Systems   12 point review of symptoms as per HPI otherwise negative  I have reviewed the patient's PMH, PSH, Social History, Family History, Meds, and Allergies  Historical Information   Past Medical History:   Diagnosis Date    Stroke (HCC)      No past surgical history on file.  Social History     Tobacco Use    Smoking status: Never    Smokeless tobacco: Never   Substance and Sexual Activity    Alcohol use: Not on file    Drug use: Not on file    Sexual activity: Not on file     E-Cigarette/Vaping     E-Cigarette/Vaping Substances     No family history on file.  Social History     Tobacco Use    Smoking status: Never    Smokeless tobacco: Never   Substance and Sexual Activity    Alcohol use: Not on file    Drug use: Not on file    Sexual activity: Not on file       Current Facility-Administered Medications:     acetaminophen (Ofirmev) injection 1,000 mg, Q6H PRN, Last Rate: 1,000 mg (01/21/25 0102)    atorvastatin (LIPITOR) tablet 40 mg, Daily    clopidogrel (PLAVIX) tablet 75 mg, Daily    enoxaparin (LOVENOX) subcutaneous injection 40 mg, Daily    iohexol (OMNIPAQUE) 350 MG/ML injection (MULTI-DOSE) 85 mL, Once in imaging    lidocaine (LIDODERM) 5 % patch 1 patch, Daily  Prior to Admission Medications    Prescriptions Last Dose Informant Patient Reported? Taking?   FLUoxetine (PROzac) 10 mg capsule 1/20/2025  Yes Yes   Sig: Take 10 mg by mouth daily   Multiple Vitamin (multivitamin) tablet 1/20/2025  Yes Yes   Sig: Take 1 tablet by mouth daily   amLODIPine (NORVASC) 5 mg tablet 1/20/2025  Yes Yes   Sig: Take 5 mg by mouth daily   atorvastatin (LIPITOR) 40 mg tablet 1/20/2025  Yes Yes   Sig: Take 40 mg by mouth daily   clopidogrel (PLAVIX) 75 mg tablet 1/20/2025  Yes Yes   Sig: Take 75 mg by mouth daily      Facility-Administered Medications: None     Patient has no known allergies.    Objective :  Temp:  [97.5 °F (36.4 °C)-98.3 °F (36.8 °C)] 97.5 °F (36.4 °C)  HR:  [53-67] 53  BP: (119-158)/(61-87) 140/72  Resp:  [15-20] 18  SpO2:  [89 %-96 %] 94 %  O2 Device: Nasal cannula  Nasal Cannula O2 Flow Rate (L/min):  [2 L/min] 2 L/min    Vital Sign reviewed  Constitutional - in NAD  HEENT - NC/AT, no icterus noted, conjunctiva clear, oral mucosa free of exudate, no tongue bite  Cardiac - ate regular  Lungs -  no wheezing noted.  Abdomen - Soft and non tender, non distended  Extremities - No edema noted  Skin - no rashes noted    Neurological  Mental status - the patient is awake alert oriented x 3, with no evidence of aphasia or dysarthria, patient is able to follow simple commands, is able to follow complex commands.  No para-phasic errors noted.  She is able to read and recognize objects presented to her  Cranial nerves 2 through 12 are intact - VF intact, no facial droop noted, V1-V3 intact.   Motor - 5/5 in the left upper extremity, right upper extremity limited at the shoulder secondary to prior stroke arthropathy, however distally 4+ out of 5.  Lower she was able to lift off the bed without any evidence of focality.  No tremor observed.   Sensation - non-focal to touch, non focal to temperature, no neglect noted.   Coordination -  no ataxia noted on finger-to-nose on the right upper, left upper difficult secondary  "to weakness.   Reflexes are equal 1 in the uppers and lowers.   Toes are equivocal bilaterally  No evidence of clonus or myoclonus or tremor.  No evidence of seizure activity, observed.      (Examined alongside attending)      Lab Results: I have reviewed the following results:I have personally reviewed pertinent reports.  , CBC:   Results from last 7 days   Lab Units 01/21/25  0513 01/20/25  1830   WBC Thousand/uL 7.45 7.30   RBC Million/uL 3.99 4.03   HEMOGLOBIN g/dL 12.0 12.0   HEMATOCRIT % 35.8 36.6   MCV fL 90 91   PLATELETS Thousands/uL 183 217   , BMP/CMP:   Results from last 7 days   Lab Units 01/21/25  0513 01/20/25  1830   SODIUM mmol/L 141 137   POTASSIUM mmol/L 3.5 3.4*   CHLORIDE mmol/L 109* 105   CO2 mmol/L 26 26   BUN mg/dL 14 14   CREATININE mg/dL 0.42* 0.48*   CALCIUM mg/dL 8.7 8.3*   EGFR ml/min/1.73sq m 92 88   , Vitamin B12:   , HgBA1C:   , TSH:   , Coagulation:   Results from last 7 days   Lab Units 01/20/25  1830   INR  1.00   , Lipid Profile:   Results from last 7 days   Lab Units 01/21/25  0513   HDL mg/dL 48*   LDL CALC mg/dL 63   TRIGLYCERIDES mg/dL 102   , Ammonia:   , Urinalysis:       Invalid input(s): \"URIBILINOGEN\", Drug Screen:   , Medication Drug Levels:       Invalid input(s): \"CARBAMAZEPINE\", \"OXCARBAZEPINE\"  Recent Labs     01/21/25  0513   WBC 7.45   HGB 12.0   HCT 35.8      SODIUM 141   K 3.5   *   CO2 26   BUN 14   CREATININE 0.42*   GLUC 98   MG 1.4*       Procedure: CTA stroke alert (head/neck)  Result Date: 1/20/2025  Narrative: CTA NECK AND BRAIN WITH CONTRAST INDICATION: Stroke Alert COMPARISON:   Head CT from 1/28/2025, 8/10/2024 TECHNIQUE:  Post contrast imaging was performed after administration of iodinated contrast through the neck and brain. Post contrast axial 0.625 mm images timed to opacify the arterial system.  3D rendering was performed on an independent workstation.   MIP reconstructions performed. Coronal and sagittal reconstructions were " performed of the non contrast portion of the brain. Radiation dose length product (DLP) for this visit:  479 mGy-cm .  This examination, like all CT scans performed in the [facility], was performed utilizing techniques to minimize radiation dose exposure, including the use of iterative reconstruction and automated exposure control. IV Contrast: Administered IMAGE QUALITY:   Diagnostic FINDINGS: CTA NECK ARCH AND GREAT VESSELS: Mild aneurysmal dilatation of the visualized ascending aorta measuring up to 4.0 cm in maximal dimensions. Configuration of the great vessel origins is typical. Mixed atherosclerotic plaque involving the visualized aortic arch extending into the great vessel origins, without stenosis. VERTEBRAL ARTERIES: Mild calcified atherosclerotic plaque at the origin of the left vertebral artery without stenosis. No right vertebral artery stenosis. No cervical vertebral artery occlusion or dissection identified. RIGHT CAROTID: Mixed atherosclerotic plaque involving the right carotid bifurcation/carotid bulb, without stenosis. Tortuous proximal to mid right cervical internal carotid artery, extending into the right parapharyngeal soft tissues, with mild vessel kinking in this region. No occlusion, high-grade stenosis or dissection identified. LEFT CAROTID: Mixed atherosclerotic plaque involving the left carotid bulb/bifurcation, resulting in approximately 20 to 30% stenosis. No occlusion.   No dissection. NASCET criteria was used to determine the degree of internal carotid artery diameter stenosis. CTA BRAIN: INTERNAL CAROTID ARTERIES: Calcified atherosclerotic plaque involving the bilateral internal carotid siphons. No stenosis. No occlusion. ANTERIOR CEREBRAL ARTERY CIRCULATION:  No stenosis or occlusion. 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 MIDDLE CEREBRAL ARTERY CIRCULATION: Mild stenosis involving the right middle cerebral artery M1 segment due to  mixed atherosclerotic plaque. Mild stenosis of the proximal left middle cerebral artery M2 segment. No high-grade stenosis or occlusion identified. DISTAL VERTEBRAL ARTERIES: Mixed atherosclerotic plaque involving the intradural vertebral arteries, resulting in at most mild stenosis. No occlusion. BASILAR ARTERY: Mild mixed atherosclerotic plaque involving the proximal basilar artery, without significant stenosis or occlusion. 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 Moderate to high-grade stenosis of the right posterior cerebral artery P2 segment, and mild stenosis of the left posterior cerebral artery P2 segment VENOUS STRUCTURES: Not well evaluated on this study. NON VASCULAR ANATOMY BONY STRUCTURES: Multilevel cervical and upper thoracic spondylosis. Diffuse osteopenia. No fractures are identified. SOFT TISSUES OF THE NECK: Multiple tiny bilateral thyroid nodules for which no further imaging evaluation or follow-up is needed given the size and appearance. THORACIC INLET:  Unremarkable.     Impression: Moderate to high-grade stenosis involving the right posterior artery P2 segment, and mild stenosis of the P2 segment, mild stenosis of the distal right M1 segment and mild stenosis of a left middle cerebral artery proximal M2 segment due to atherosclerotic plaque. No large vessel occlusion or intracranial aneurysm identified. No hemodynamically significant stenosis, dissection or occlusion identified on CT angiogram of the neck. Findings were directly discussed with Simone Harrington at 6:17 p.m. on 1/20/2025.. Workstation performed: [id number]     Procedure: CT stroke alert brain  Result Date: 1/20/2025  Narrative: CT BRAIN - STROKE ALERT PROTOCOL INDICATION:   Stroke Alert. COMPARISON: 8/10/2024 TECHNIQUE:  CT examination of the brain was performed.  In addition to  axial images, coronal reformatted images were created and submitted for interpretation. Radiation dose length product (DLP) for this visit:  927 mGy-cm .  This examination, like all CT scans performed in the [facility], was performed utilizing techniques to minimize radiation dose exposure, including the use of iterative reconstruction and automated exposure control. IMAGE QUALITY:  Diagnostic. FINDINGS: PARENCHYMA: Decreased attenuation is noted in periventricular and subcortical white matter demonstrating an appearance that is statistically most likely to represent moderate microangiopathic change. Old lacunar infarcts involving the periventricular white matter of the right frontal lobe. No CT signs of acute infarction.  No intracranial mass, mass effect or midline shift.  No acute parenchymal hemorrhage. Atherosclerotic calcifications involving the internal carotid siphons, the intradural vertebral arteries and the basilar artery. VENTRICLES AND EXTRA-AXIAL SPACES: No hydrocephalus noted. VISUALIZED ORBITS: Normal visualized orbits. PARANASAL SINUSES: Normal visualized paranasal sinuses. CALVARIUM AND EXTRACRANIAL SOFT TISSUES:   Normal.     Impression: No acute intracranial abnormality. Moderate chronic white matter microangiopathic changes. Old lacunar infarcts as described above. Findings were directly discussed with Simone Harrington at approximately 6:10 p.m. on 1/20/2025. Workstation performed: [id number]      Reviewed case with neurology attending, history and physical examination, labs and imaging completed, plan of care as per attending physician.  Please see attestation for further details.  Examined alongside attending physician.  Acted as a scribe in this case.

## 2025-01-21 NOTE — ASSESSMENT & PLAN NOTE
Hold home fluoxetine until speech clearance   Recommendation Preamble: The following recommendations were made during the visit: Recommendations (Free Text): Total Body Photography - [facility] Detail Level: Zone Render Risk Assessment In Note?: no

## 2025-01-21 NOTE — ASSESSMENT & PLAN NOTE
Central chest discomfort that stated after her near fall when her family members caught her  Likely MSK given very tender to palpation  CXR negative   Lidocaine patch ineffective, IV tylenol helping   Trial topical voltaren gel

## 2025-01-21 NOTE — ASSESSMENT & PLAN NOTE
"Patient presented to the ED as a prehospital stroke alert.   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  She presented with dizziness at home and near syncope where she almost fell but her family caught her. They reported her \"eyes rolled back\" prior to this.  NIHSS was 1 on arrival with residual right UE deficit noted.  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; no acute dissection or occlusion noted   Stroke pathway initiated,  Neuro consulted  PT, OT, SLP consulted  Neurochecks per protocol  Telemetry monitor   Continue plavix, statin  Well-controlled A1c and lipid panel  Stat CTH for any acute neuro changes  Lovenox for DVT prophylaxis  MRI andecho pending, per neuro  "

## 2025-01-21 NOTE — ASSESSMENT & PLAN NOTE
"Ibeth Burrell is an 89 year old woman with hx of prior stroke who presented to the hospital for evaluation of dizziness and N/V. Around 1630 on 1/20, pt began to have dizziness in addition to N/V and chest pain. Symptoms were persistent, which is why she came to the hospital for evaluation. 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 On Plavix at home. No other noted symptoms. Reported to have significant improvement in symptoms after return from CT scan.  She described her dizziness as room spinning.  No other reported focal neurological complaints or findings on examination.   - NIHSS 1 (has residual RUE drift from prior stroke, at baseline)  - LKW 1630  - The patient was not a tnk or thrombectomy candidate.     CT of head - \"No acute intracranial abnormality. Moderate chronic white matter microangiopathic changes. Old lacunar infarcts as described above. :  CTA of head and neck - \"Moderate to high-grade stenosis involving the right posterior artery P2 segment, and mild stenosis of the P2 segment, mild stenosis of the distal right M1 segment and mild stenosis of a left middle cerebral artery proximal M2 segment due to atherosclerotic plaque. No large vessel occlusion or intracranial aneurysm identified.  No hemodynamically significant stenosis, dissection or occlusion identified on CT angiogram of the neck.\"    Vital signs arrival temperature was 97.7 blood pressure was 158/87 SpO2 was 96.    Labs/Testing:  Glucose 115  BMP with potassium of 3.4, calcium 8.3  CBC was normal  INR was normal  APTT were normal  Trop was normal  LDL was 63  A1c pending  Mg was 1.4L     Symptoms may potentially be peripheral in nature but in light of risk factors for stroke/hx of stoke, would recommend MRI brain and further evaluation     - Stroke pathway  MRI brain  Echo  LDL was 63  Hemoglobin A1c  Loaded with aspirin, currently on home Plavix.   Atorvastatin 40 mg  Continue " telemetry  PT/OT/ST  Frequent neuro checks. Continue to monitor and notify neurology with any changes.   - Medical management and supportive care per primary team. Correction of any metabolic or infectious disturbances.    - Reviewed with attending plan of care per attending physician.  Please see attestation for completed details.   Acted as scribe in this case.

## 2025-01-21 NOTE — PHYSICAL THERAPY NOTE
Physical Therapy Evaluation     Patient's Name: Ibeth Burrell    Admitting Diagnosis  Dizziness [R42]  Chest pain [R07.9]  Stroke (HCC) [I63.9]  Acute headache [R51.9]  Stroke-like symptoms [R29.90]    Problem List  Patient Active Problem List   Diagnosis    Stroke-like symptoms    HTN (hypertension)    MDD (major depressive disorder)    Hypokalemia    Chest pain     Past Medical History  Past Medical History:   Diagnosis Date    Stroke (HCC)      Past Surgical History  No past surgical history on file.     01/21/25 0833   PT Last Visit   PT Visit Date 01/21/25   Note Type   Note type Evaluation and Treatment   Pain Assessment   Pain Assessment Tool FLACC   Pain Score 10 - Worst Possible Pain   Pain Location/Orientation Location: Chest   Pain Onset/Description Onset: Ongoing   Hospital Pain Intervention(s) Repositioned;Ambulation/increased activity   Pain Rating: FLACC (Rest) - Face 0   Pain Rating: FLACC (Rest) - Legs 0   Pain Rating: FLACC (Rest) - Activity 0   Pain Rating: FLACC (Rest) - Cry 1   Pain Rating: FLACC (Rest) - Consolability 0   Score: FLACC (Rest) 1   Pain Rating: FLACC (Activity) - Face 0   Pain Rating: FLACC (Activity) - Legs 0   Pain Rating: FLACC (Activity) - Activity 0   Pain Rating: FLACC (Activity) - Cry 1   Pain Rating: FLACC (Activity) - Consolability 0   Score: FLACC (Activity) 1   Restrictions/Precautions   Weight Bearing Precautions Per Order No   Other Precautions Chair Alarm;Bed Alarm;Telemetry;Fall Risk;Pain   Home Living   Type of Home House   Home Layout One level;Able to live on main level with bedroom/bathroom;Performs ADLs on one level;Stairs to enter with rails  (4 HOLLI)   Bathroom Shower/Tub Tub/shower unit   Bathroom Toilet Standard   Bathroom Equipment Shower chair   Bathroom Accessibility Accessible   Home Equipment Walker  (rollator)   Additional Comments Pt ambulates with a rollator.   Prior Function   Level of [place] Independent with functional mobility;Needs  "assistance with ADLs;Needs assistance with IADLS   Lives With Daughter;Family   Receives Help From Family   IADLs Family/Friend/Other provides transportation;Family/Friend/Other provides meals;Family/Friend/Other provides medication management   Falls in the last 6 months 1 to 4  (2 falls)   Vocational Retired   General   Family/Caregiver Present No   Cognition   Overall Cognitive Status WFL   Arousal/Participation Alert   Orientation Level Oriented X4  (oriented to month; not year)   Memory Within functional limits   Following Commands Follows multistep commands without difficulty   Comments Pt agreeable to PT.   Subjective   Subjective \"I couldn't even function.\"   RLE Assessment   RLE Assessment X  (chronic R sided weakness from previous CVA)   Strength RLE   RLE Overall Strength 3/5   LLE Assessment   LLE Assessment X   Strength LLE   LLE Overall Strength 4-/5   Light Touch   RLE Light Touch Grossly intact   LLE Light Touch Grossly intact   Bed Mobility   Supine to Sit 4  Minimal assistance   Additional items Assist x 1;HOB elevated;Bedrails;Increased time required;Verbal cues   Additional Comments Pt denied complaints of dizziness upon sitting at EOB.   Transfers   Sit to Stand 4  Minimal assistance   Additional items Assist x 1;Increased time required;Verbal cues   Stand to Sit 4  Minimal assistance   Additional items Assist x 1;Armrests;Increased time required;Verbal cues   Ambulation/Elevation   Gait pattern Short stride;Shuffling   Gait Assistance 4  Minimal assist   Additional items Assist x 1;Verbal cues   Assistive Device Rolling walker   Distance 15 feet x 2 trials   Ambulation/Elevation Additional Comments Pt denied complaints of dizziness with all functional mobility.   Balance   Static Sitting Fair +   Dynamic Sitting Fair   Static Standing Fair -   Dynamic Standing Poor +   Ambulatory Poor +   Endurance Deficit   Endurance Deficit Yes   Endurance Deficit Description decreased activity tolerance "   Activity Tolerance   Activity Tolerance Patient tolerated treatment well   Nurse Made Aware ARAM Jackson   Assessment   Prognosis Good   Problem List Decreased strength;Decreased endurance;Impaired balance;Decreased mobility;Pain   Assessment Pt is 89 year old female seen for PT evaluation s/p admit to [facility] on 1/20/2025 with Stroke-like symptoms. PT consulted to assess pt's functional mobility and discharge needs. Order placed for PT evaluation and treatment. Comorbidities affecting pt's physical performance at time of assessment include hypertension, major depressive disorder, hypokalemia, and chest pain. Prior to hospitalization, pt was independent with all functional mobility with a rollator. Pt ambulates household and community distances. Pt resides with her daughter and family, in a one level house with four steps to enter. Personal factors affecting pt at time of initial evaluation include stairs to enter home, ambulating with an assistive device, difficulty ambulating household distances, inability to ambulate community distances, difficulty navigating level surfaces without external assistance, difficulty performing dynamic tasks in the community, positive fall history, difficulty performing ADLs, and inability to perform IADLs. Please find objective findings from PT assessment regarding body systems outlined above with impairments and limitations including weakness, impaired balance, decreased endurance, gait deviations, pain, decreased activity tolerance, decreased functional mobility tolerance, and fall risk. The following objective measures were performed on initial evaluation Barthel Index: 45/100, Modified [place]: 4 (moderate/severe disability), and AM-PAC 6-Clicks: 17/24. Pt's clinical presentation is currently evolving seen in pt's presentation of need for ongoing medical management/monitoring, pt is a fall risk, and pt requires cues and assist for safety with functional mobility. Pt  to benefit from continued PT treatment to address deficits as defined above and maximize pt's level of function and independence with mobility. From a PT standpoint, recommendation at time of discharge would be level 3, minimal resource intensity in order to facilitate return to prior level of function.   Barriers to Discharge Inaccessible home environment   Goals   STG Expiration Date 01/31/25   Short Term Goal #1 In 10 days: Increase bilateral LE strength 1/2 grade to facilitate independent mobility, Perform all bed mobility tasks modified independent to decrease caregiver burden, Perform all transfers modified independent to improve independence, Ambulate > 150 ft. with RW modified independent w/o LOB and w/ normalized gait pattern 100% of the time, Navigate 4 stair(s) with min A of 1 with unilateral handrail to facilitate return to previous living environment, and Increase all balance 1/2 grade to decrease risk for falls   PT Treatment Day 1   Plan   Treatment/Interventions Functional transfer training;LE strengthening/ROM;Elevations;Therapeutic exercise;Endurance training;Patient/family training;Bed mobility;Gait training;Spoke to nursing;OT;ST   PT Frequency 2-3x/wk   Discharge Recommendation   Rehab Resource Intensity Level, PT III (Minimum Resource Intensity)   AM-PAC Basic Mobility Inpatient   Turning in Flat Bed Without Bedrails 3   Lying on Back to Sitting on Edge of Flat Bed Without Bedrails 3   Moving Bed to Chair 3   Standing Up From Chair Using Arms 3   Walk in Room 3   Climb 3-5 Stairs With Railing 2   Basic Mobility Inpatient Raw Score 17   Basic Mobility Standardized Score 39.67   [facility] Highest Level Of Mobility   -HL Goal 5: Stand one or more mins   -HLM Achieved 7: Walk 25 feet or more   Modified [place] Scale   Modified [place] Scale 4   Barthel Index   Feeding 10   Bathing 0   Grooming Score 0   Dressing Score 5   Bladder Score 5   Bowels Score 10   Toilet Use Score 5   Transfers  (Bed/Chair) Score 10   Mobility (Level Surface) Score 0   Stairs Score 0   Barthel Index Score 45   Additional Treatment Session   Start Time 0852   End Time 0902   Treatment Assessment Pt agreeable to PT treatment session following PT evaluation. Pt performed seated therapeutic exercise as indicated below. Pt required occasional verbal cues for correct technique and form. Pt tolerated therapeutic exercise well without complaints of pain. Pt continues to exhibit decreased lower extremity strength, impaired balance, decreased endurance, gait deviations, and decreased functional mobility. PT to continue to recommend level 3, minimal resource intensity. PT to continue to follow and treat as appropriate.   Exercises   Hip Flexion Sitting;20 reps;AROM;Bilateral   Hip Abduction Sitting;20 reps;AROM;Bilateral  (long sitting)   Hip Adduction Sitting;20 reps;AROM;Bilateral   Knee AROM Long Arc Quad Sitting;20 reps;AROM;Bilateral   Ankle Pumps Sitting;20 reps;AROM;Bilateral   End of Consult   Patient Position at End of Consult Bedside chair;Bed/Chair alarm activated;All needs within reach  (RN made aware)     PT Evaluation Time: 0833-0851    PT Treatment Time: 0852-0902  10 minutes  Lee Ann Crook, PT, DPT

## 2025-01-21 NOTE — PLAN OF CARE
Problem: PHYSICAL THERAPY ADULT  Goal: Performs mobility at highest level of function for planned discharge setting.  See evaluation for individualized goals.  Description: Treatment/Interventions: Functional transfer training, LE strengthening/ROM, Elevations, Therapeutic exercise, Endurance training, Patient/family training, Bed mobility, Gait training, Spoke to nursing, OT, ST          See flowsheet documentation for full assessment, interventions and recommendations.  Note: Prognosis: Good  Problem List: Decreased strength, Decreased endurance, Impaired balance, Decreased mobility, Pain  Assessment: Pt is 89 year old female seen for PT evaluation s/p admit to [facility] on 1/20/2025 with Stroke-like symptoms. PT consulted to assess pt's functional mobility and discharge needs. Order placed for PT evaluation and treatment. Comorbidities affecting pt's physical performance at time of assessment include hypertension, major depressive disorder, hypokalemia, and chest pain. Prior to hospitalization, pt was independent with all functional mobility with a rollator. Pt ambulates household and community distances. Pt resides with her daughter and family, in a one level house with four steps to enter. Personal factors affecting pt at time of initial evaluation include stairs to enter home, ambulating with an assistive device, difficulty ambulating household distances, inability to ambulate community distances, difficulty navigating level surfaces without external assistance, difficulty performing dynamic tasks in the community, positive fall history, difficulty performing ADLs, and inability to perform IADLs. Please find objective findings from PT assessment regarding body systems outlined above with impairments and limitations including weakness, impaired balance, decreased endurance, gait deviations, pain, decreased activity tolerance, decreased functional mobility tolerance, and fall risk. The following  objective measures were performed on initial evaluation Barthel Index: 45/100, Modified [place]: 4 (moderate/severe disability), and AM-PAC 6-Clicks: 17/24. Pt's clinical presentation is currently evolving seen in pt's presentation of need for ongoing medical management/monitoring, pt is a fall risk, and pt requires cues and assist for safety with functional mobility. Pt to benefit from continued PT treatment to address deficits as defined above and maximize pt's level of function and independence with mobility. From a PT standpoint, recommendation at time of discharge would be level 3, minimal resource intensity in order to facilitate return to prior level of function.    Barriers to Discharge: Inaccessible home environment     Rehab Resource Intensity Level, PT: III (Minimum Resource Intensity)    See flowsheet documentation for full assessment.

## 2025-01-21 NOTE — PLAN OF CARE
Problem: Prexisting or High Potential for Compromised Skin Integrity  Goal: Skin integrity is maintained or improved  Description: INTERVENTIONS:  - Identify patients at risk for skin breakdown  - Assess and monitor skin integrity  - Assess and monitor nutrition and hydration status  - Monitor labs   - Assess for incontinence   - Turn and reposition patient  - Assist with mobility/ambulation  - Relieve pressure over bony prominences  - Avoid friction and shearing  - Provide appropriate hygiene as needed including keeping skin clean and dry  - Evaluate need for skin moisturizer/barrier cream  - Collaborate with interdisciplinary team   - Patient/family teaching  - Consider wound care consult   Outcome: Progressing     Problem: Neurological Deficit  Goal: Neurological status is stable or improving  Description: Interventions:  - Monitor and assess patient's level of consciousness, motor function, sensory function, and level of assistance needed for ADLs.   - Monitor and report changes from baseline. Collaborate with interdisciplinary team to initiate plan and implement interventions as ordered.   - Provide and maintain a safe environment.  - Consider seizure precautions.  - Consider fall precautions.  - Consider aspiration precautions.  - Consider bleeding precautions.  Outcome: Progressing     Problem: Activity Intolerance/Impaired Mobility  Goal: Mobility/activity is maintained at optimum level for patient  Description: Interventions:  - Assess and monitor patient  barriers to mobility and need for assistive/adaptive devices.  - Assess patient's emotional response to limitations.  - Collaborate with interdisciplinary team and initiate plans and interventions as ordered.  - Encourage independent activity per ability.  - Maintain proper body alignment.  - Perform active/passive rom as tolerated/ordered.  - Plan activities to conserve energy.  - Turn patient as appropriate  Outcome: Progressing     Problem:  Communication Impairment  Goal: Ability to express needs and understand communication  Description: Assess patient's communication skills and ability to understand information.  Patient will demonstrate use of effective communication techniques, alternative methods of communication and understanding even if not able to speak.     - Encourage communication and provide alternate methods of communication as needed.  - Collaborate with case management/ for discharge needs.  - Include patient/family/caregiver in decisions related to communication.  Outcome: Progressing     Problem: Potential for Aspiration  Goal: Non-ventilated patient's risk of aspiration is minimized  Description: Assess and monitor vital signs, respiratory status, and labs (WBC).  Monitor for signs of aspiration (tachypnea, cough, rales, wheezing, cyanosis, fever).    - Assess and monitor patient's ability to swallow.  - Place patient up in chair to eat if possible.  - HOB up at 90 degrees to eat if unable to get patient up into chair.  - Supervise patient during oral intake.   - Instruct patient/ family to take small bites.  - Instruct patient/ family to take small single sips when taking liquids.  - Follow patient-specific strategies generated by speech pathologist.  Outcome: Progressing  Goal: Ventilated patient's risk of aspiration is minimized  Description: Assess and monitor vital signs, respiratory status, airway cuff pressure, and labs (WBC).  Monitor for signs of aspiration (tachypnea, cough, rales, wheezing, cyanosis, fever).    - Elevate head of bed 30 degrees if patient has tube feeding.  - Monitor tube feeding.  Outcome: Progressing     Problem: Nutrition  Goal: Nutrition/Hydration status is improving  Description: Monitor and assess patient's nutrition/hydration status for malnutrition (ex- brittle hair, bruises, dry skin, pale skin and conjunctiva, muscle wasting, smooth red tongue, and disorientation). Collaborate with  interdisciplinary team and initiate plan and interventions as ordered.  Monitor patient's weight and dietary intake as ordered or per policy. Utilize nutrition screening tool and intervene per policy. Determine patient's food preferences and provide high-protein, high-caloric foods as appropriate.     - Assist patient with eating.  - Allow adequate time for meals.  - Encourage patient to take dietary supplement as ordered.  - Collaborate with clinical nutritionist.  - Include patient/family/caregiver in decisions related to nutrition.  Outcome: Progressing

## 2025-01-21 NOTE — PROGRESS NOTES
"Progress Note - Hospitalist   Name: Ibeth Burrell 89 y.o. female I MRN: [id number]  Unit/Bed#: 2 E 267-01 I Date of Admission: 1/20/2025   Date of Service: 1/21/2025 I Hospital Day: 0    Assessment & Plan  Stroke-like symptoms  Patient presented to the ED as a prehospital stroke alert.   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  She presented with dizziness at home and near syncope where she almost fell but her family caught her. They reported her \"eyes rolled back\" prior to this.  NIHSS was 1 on arrival with residual right UE deficit noted.  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; no acute dissection or occlusion noted   Stroke pathway initiated,  Neuro consulted  PT, OT, SLP consulted  Neurochecks per protocol  Telemetry monitor   Continue plavix, statin  Well-controlled A1c and lipid panel  Stat CTH for any acute neuro changes  Lovenox for DVT prophylaxis  MRI andecho pending, per neuro  HTN (hypertension)  Permissive HTN first 24 hrs  Monitor VS per stroke protocol   Blood Pressure: 140/74   MDD (major depressive disorder)  Continue fluoxetine   Hypokalemia  Mild K 3.4, s/p IV replacement   IV potassium chloride repletion ordered   Chest pain  Central chest discomfort that stated after her near fall when her family members caught her  Likely MSK given very tender to palpation  CXR negative   Lidocaine patch ineffective, IV tylenol helping   Trial topical voltaren gel    VTE Pharmacologic Prophylaxis: VTE Score: 3 Moderate Risk (Score 3-4) - Pharmacological DVT Prophylaxis Ordered: enoxaparin (Lovenox).    Mobility:   Basic Mobility Inpatient Raw Score: 17  JH-HLM Goal: 5: Stand one or more mins  JH-HLM Achieved: 7: Walk 25 feet or more  JH-HLM Goal achieved. Continue to encourage appropriate mobility.    Patient Centered Rounds: I performed bedside rounds with nursing staff today.   Discussions with Specialists or " Other Care Team Provider: CM, neuro consult reviewed     Education and Discussions with Family / Patient: Updated  (daughter) via phone.    Current Length of Stay: 0 day(s)  Current Patient Status: Observation   Certification Statement: The patient, admitted on an observation basis, will now require > 2 midnight hospital stay due to eval for peripheral vs central etiology for dizziness  Discharge Plan: Anticipate discharge tomorrow to home.    Code Status: Level 1 - Full Code    Subjective   Patient denies issues right now, happy to be able to eat. No new neurologic deficits. Does report chest pain with palpation, doesn't seem the lidoderm patch helps but the IV tylenol did. Daughter reports at least 5 strokes in past, with blindness from intracranial hemorrhage.     Objective :  Temp:  [97.5 °F (36.4 °C)-98.3 °F (36.8 °C)] 97.5 °F (36.4 °C)  HR:  [53-67] 55  BP: (119-158)/(61-87) 140/74  Resp:  [15-20] 18  SpO2:  [89 %-96 %] 95 %  O2 Device: None (Room air)  Nasal Cannula O2 Flow Rate (L/min):  [2 L/min] 2 L/min    Body mass index is 24.79 kg/m².     Input and Output Summary (last 24 hours):     Intake/Output Summary (Last 24 hours) at 1/21/2025 1412  Last data filed at 1/21/2025 0355  Gross per 24 hour   Intake --   Output 150 ml   Net -150 ml       Physical Exam  Vitals and nursing note reviewed.   Constitutional:       General: She is not in acute distress.     Appearance: Normal appearance. She is not ill-appearing or toxic-appearing.   Cardiovascular:      Rate and Rhythm: Normal rate and regular rhythm.      Heart sounds: Normal heart sounds.   Pulmonary:      Effort: Pulmonary effort is normal. No respiratory distress.      Breath sounds: Normal breath sounds. No wheezing.   Abdominal:      General: Bowel sounds are normal. There is no distension.      Palpations: Abdomen is soft.      Tenderness: There is no abdominal tenderness.   Skin:     General: Skin is warm and dry.      Coloration: Skin  is not pale.   Neurological:      Mental Status: She is alert and oriented to person, place, and time.      Motor: Weakness (RUE) present.   Psychiatric:         Mood and Affect: Mood normal.         Behavior: Behavior normal.           Lines/Drains:        Telemetry:  Telemetry Orders (From admission, onward)               24 Hour Telemetry Monitoring  Continuous x 24 Hours (Telem)        Expiring   Question:  Reason for 24 Hour Telemetry  Answer:  TIA/Suspected CVA/ Confirmed CVA                     Telemetry Reviewed: Normal Sinus Rhythm  Indication for Continued Telemetry Use: No indication for continued use. Will discontinue.                Lab Results: I have reviewed the following results:   Results from last 7 days   Lab Units 01/21/25  0513   WBC Thousand/uL 7.45   HEMOGLOBIN g/dL 12.0   HEMATOCRIT % 35.8   PLATELETS Thousands/uL 183   SEGS PCT % 70   LYMPHO PCT % 18   MONO PCT % 9   EOS PCT % 3     Results from last 7 days   Lab Units 01/21/25  0513   SODIUM mmol/L 141   POTASSIUM mmol/L 3.5   CHLORIDE mmol/L 109*   CO2 mmol/L 26   BUN mg/dL 14   CREATININE mg/dL 0.42*   ANION GAP mmol/L 6   CALCIUM mg/dL 8.7   GLUCOSE RANDOM mg/dL 98     Results from last 7 days   Lab Units 01/20/25  1830   INR  1.00     Results from last 7 days   Lab Units 01/20/25  1816   POC GLUCOSE mg/dl 115     Results from last 7 days   Lab Units 01/21/25  0513   HEMOGLOBIN A1C % 5.4           Recent Cultures (last 7 days):         Imaging Results Review: I reviewed radiology reports from this admission including: CTA head/neck and CT head.  Other Study Results Review: No additional pertinent studies reviewed.    Last 24 Hours Medication List:     Current Facility-Administered Medications:     acetaminophen (Ofirmev) injection 1,000 mg, Q6H PRN, Last Rate: 1,000 mg (01/21/25 1052)    acetaminophen (TYLENOL) tablet 650 mg, Q6H PRN    atorvastatin (LIPITOR) tablet 40 mg, Daily    clopidogrel (PLAVIX) tablet 75 mg, Daily    Diclofenac  Sodium (VOLTAREN) 1 % topical gel 2 g, 4x Daily    enoxaparin (LOVENOX) subcutaneous injection 40 mg, Daily    iohexol (OMNIPAQUE) 350 MG/ML injection (MULTI-DOSE) 85 mL, Once in imaging    meclizine (ANTIVERT) tablet 25 mg, Q8H PRN    ondansetron (ZOFRAN) injection 4 mg, Q4H PRN    polyethylene glycol (MIRALAX) packet 17 g, Daily PRN    Administrative Statements   Today, Patient Was Seen By: Nika Gallego PA-C  I have spent a total time of 45 minutes in caring for this patient on the day of the visit/encounter including Counseling / Coordination of care, Documenting in the medical record, Reviewing / ordering tests, medicine, procedures  , and Communicating with other healthcare professionals .    **Please Note: This note may have been constructed using a voice recognition system.**

## 2025-01-21 NOTE — ED PROVIDER NOTES
Time reflects when diagnosis was documented in both MDM as applicable and the Disposition within this note       Time User Action Codes Description Comment    1/20/2025  6:03 PM Erne, Brendan Add [R29.90] Stroke-like symptoms     1/20/2025  7:25 PM Erne, Brendan Add [R42] Dizziness     1/20/2025  7:25 PM Erne, Brendan Modify [R29.90] Stroke-like symptoms     1/20/2025  7:25 PM Erne, Brendan Modify [R42] Dizziness     1/20/2025  7:25 PM Erne, Brendan Add [R07.9] Chest pain     1/20/2025  7:25 PM Erne, Brendan Add [R51.9] Acute headache           ED Disposition       ED Disposition   Admit    Condition   Stable    Date/Time   Mon Jan 20, 2025  7:25 PM    Comment   Case was discussed with BENTLEY and the patient's admission status was agreed to be Admission Status: observation status to the service of Dr. Ventura .               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  Positional room spinning dizziness that began with positional change in setting of otherwise normal neuroexam except for patient's baseline right upper extremity motor deficit.  Patient was called as a prehospital stroke alert by other provider.  Discussed case with on-call neurology.  No acute process on CT imaging.  Symptoms significantly improved shortly after arrival.  Non-debilitating symptoms.  Discussed options with neurology and patient.  Will defer lysis at this time.  Patient reports further improvement with medications for dizziness and pain.  Aspirin load per neurology.  Plan for further evaluation and management inpatient.  Admitted.    Amount and/or Complexity of Data Reviewed  Labs: ordered.  Radiology: ordered.    Risk  OTC drugs.  Prescription drug management.  Decision regarding hospitalization.             Medications   iohexol (OMNIPAQUE) 350 MG/ML injection (MULTI-DOSE) 85 mL (has no administration in time  range)   meclizine (ANTIVERT) tablet 12.5 mg (12.5 mg Oral Given 1/20/25 1827)   sodium chloride 0.9 % bolus 500 mL (500 mL Intravenous New Bag 1/20/25 1833)   metoclopramide (REGLAN) injection 10 mg (10 mg Intravenous Given 1/20/25 1827)   fentaNYL injection 50 mcg (50 mcg Intravenous Given 1/20/25 1845)   aspirin tablet 325 mg (325 mg Oral Given 1/20/25 1914)       ED Risk Strat Scores                                              History of Present Illness       Chief Complaint   Patient presents with    STROKE Alert     Pt arrives with EMS with reports of weakness, vomiting, chest pain, and diarrhea. Per patient this is how her previous strokes have presented. LKW 1630 today        Past Medical History:   Diagnosis Date    Hypertension     Stroke (HCC)       No past surgical history on file.   No family history on file.   Social History     Tobacco Use    Smoking status: Never    Smokeless tobacco: Never      E-Cigarette/Vaping      E-Cigarette/Vaping Substances      I have reviewed and agree with the history as documented.     89-year-old female history of hypertension and previous stroke with residual right upper extremity motor deficit presenting with dizziness.  Patient reports acute onset dizziness that began when she tried to stand up.  Patient reports mild posterior headache and anterior chest pain.  Denies any motor or sensory deficits.  Reports positional room spinning dizziness worse with movement and improved with lying still and closing eyes.  Denies shortness of breath.  Denies abdominal pain nausea vomiting.  Does report episode of nonbloody diarrhea.  Denies any other complaints.  Chart reviewed.    Past Medical History:  No date: Hypertension  No date: Stroke (HCC)  Family History: non-contributory  Social History          Review of Systems   Constitutional:  Negative for appetite change, chills, diaphoresis, fever and unexpected weight change.   HENT:  Negative for congestion and rhinorrhea.     Eyes:  Negative for photophobia and visual disturbance.   Respiratory:  Negative for cough, chest tightness and shortness of breath.    Cardiovascular:  Positive for chest pain. Negative for palpitations and leg swelling.   Gastrointestinal:  Negative for abdominal distention, abdominal pain, blood in stool, constipation, diarrhea, nausea and vomiting.   Genitourinary:  Negative for dysuria and hematuria.   Musculoskeletal:  Negative for back pain, joint swelling, neck pain and neck stiffness.   Skin:  Negative for color change, pallor, rash and wound.   Neurological:  Positive for dizziness and headaches. Negative for syncope, weakness and light-headedness.   Psychiatric/Behavioral:  Negative for agitation.    All other systems reviewed and are negative.          Objective       ED Triage Vitals   Temperature Pulse Blood Pressure Respirations SpO2 Patient Position - Orthostatic VS   01/20/25 1830 01/20/25 1805 01/20/25 1805 01/20/25 1805 01/20/25 1805 01/20/25 1815   97.7 °F (36.5 °C) 67 158/87 19 96 % Lying      Temp Source Heart Rate Source BP Location FiO2 (%) Pain Score    01/20/25 1830 01/20/25 1805 -- -- 01/20/25 1805    Oral Monitor   8      Vitals      Date and Time Temp Pulse SpO2 Resp BP Pain Score FACES Pain Rating User   01/20/25 2233 98.3 °F (36.8 °C) -- -- -- -- -- -- CA   01/20/25 2233 -- 54 96 % 18 126/62 -- -- DII   01/20/25 2148 98 °F (36.7 °C) -- -- -- -- -- -- CA   01/20/25 2148 -- 56 94 % 18 145/70 -- -- DII   01/20/25 2107 -- -- -- -- -- 10 - Worst Possible Pain -- EM   01/20/25 2030 98.3 °F (36.8 °C) 57 95 % 18 142/74 -- -- DII   01/20/25 1930 -- 56 91 % 17 137/64 -- -- VMW   01/20/25 1915 -- 66 94 % 18 131/61 -- -- VMW   01/20/25 1845 -- 59 93 % 15 119/65 8 -- VMW   01/20/25 1830 97.7 °F (36.5 °C) 59 96 % 20 147/74 8 -- VMW   01/20/25 1815 -- 63 96 % 18 154/72 -- -- VMW   01/20/25 1805 -- 67 96 % 19 158/87 8 -- VMW            Physical Exam  Vitals and nursing note reviewed.    Constitutional:       General: She is not in acute distress.     Appearance: Normal appearance. She is well-developed. She is not ill-appearing, toxic-appearing or diaphoretic.   HENT:      Head: Normocephalic and atraumatic.      Nose: Nose normal. No congestion or rhinorrhea.      Mouth/Throat:      Mouth: Mucous membranes are moist.      Pharynx: Oropharynx is clear. No oropharyngeal exudate or posterior oropharyngeal erythema.   Eyes:      General: No scleral icterus.        Right eye: No discharge.         Left eye: No discharge.      Extraocular Movements: Extraocular movements intact.      Conjunctiva/sclera: Conjunctivae normal.      Pupils: Pupils are equal, round, and reactive to light.   Neck:      Vascular: No JVD.      Trachea: No tracheal deviation.      Comments: Supple. Normal range of motion.   Cardiovascular:      Rate and Rhythm: Normal rate and regular rhythm.      Heart sounds: Normal heart sounds. No murmur heard.     No friction rub. No gallop.      Comments: Normal rate and regular rhythm  Pulmonary:      Effort: Pulmonary effort is normal. No respiratory distress.      Breath sounds: Normal breath sounds. No stridor. No wheezing or rales.      Comments: Clear to auscultation bilaterally  Chest:      Chest wall: No tenderness.   Abdominal:      General: Bowel sounds are normal. There is no distension.      Palpations: Abdomen is soft.      Tenderness: There is no abdominal tenderness. There is no right CVA tenderness, left CVA tenderness, guarding or rebound.      Comments: Soft, nontender, nondistended.  Normal bowel sounds throughout   Musculoskeletal:         General: No swelling, tenderness, deformity or signs of injury. Normal range of motion.      Cervical back: Normal range of motion and neck supple. No rigidity. No muscular tenderness.      Right lower leg: No edema.      Left lower leg: No edema.   Lymphadenopathy:      Cervical: No cervical adenopathy.   Skin:     General: Skin is  warm and dry.      Coloration: Skin is not pale.      Findings: No erythema or rash.   Neurological:      General: No focal deficit present.      Mental Status: She is alert and oriented to person, place, and time. Mental status is at baseline.      Cranial Nerves: No cranial nerve deficit.      Sensory: No sensory deficit.      Motor: No weakness or abnormal muscle tone.      Coordination: Coordination normal.      Comments: A&Ox3 to person, place, and time. CN 2-12 intact. Strength 5/5 throughout. Sensation intact throughout.   Psychiatric:         Behavior: Behavior normal.         Thought Content: Thought content normal.         Results Reviewed       Procedure Component Value Units Date/Time    HS Troponin 0hr (reflex protocol) [[id number]]  (Normal) Collected: 01/20/25 1830    Lab Status: Final result Specimen: Blood from Arm, Left Updated: 01/20/25 1912     hs TnI 0hr 5 ng/L     Basic metabolic panel [[id number]]  (Abnormal) Collected: 01/20/25 1830    Lab Status: Final result Specimen: Blood from Arm, Left Updated: 01/20/25 1904     Sodium 137 mmol/L      Potassium 3.4 mmol/L      Chloride 105 mmol/L      CO2 26 mmol/L      ANION GAP 6 mmol/L      BUN 14 mg/dL      Creatinine 0.48 mg/dL      Glucose 122 mg/dL      Calcium 8.3 mg/dL      eGFR 88 ml/min/1.73sq m     Narrative:      [employer] guidelines for Chronic Kidney Disease (CKD):     Stage 1 with normal or high GFR (GFR > 90 mL/min/1.73 square meters)    Stage 2 Mild CKD (GFR = 60-89 mL/min/1.73 square meters)    Stage 3A Moderate CKD (GFR = 45-59 mL/min/1.73 square meters)    Stage 3B Moderate CKD (GFR = 30-44 mL/min/1.73 square meters)    Stage 4 Severe CKD (GFR = 15-29 mL/min/1.73 square meters)    Stage 5 End Stage CKD (GFR <15 mL/min/1.73 square meters)  Note: GFR calculation is accurate only with a steady state creatinine    Protime-INR [[id number]]  (Normal) Collected: 01/20/25 1830    Lab Status: Final result Specimen: Blood  from Arm, Left Updated: 01/20/25 1855     Protime 14.0 seconds      INR 1.00    Narrative:      INR Therapeutic Range    Indication                                             INR Range      Atrial Fibrillation                                               2.0-3.0  Hypercoagulable State                                    2.0.2.3  Left Ventricular Asist Device                            2.0-3.0  Mechanical Heart Valve                                  -    Aortic(with afib, MI, embolism, HF, LA enlargement,    and/or coagulopathy)                                     2.0-3.0 (2.5-3.5)     Mitral                                                             2.5-3.5  Prosthetic/Bioprosthetic Heart Valve               2.0-3.0  Venous thromboembolism (VTE: VT, PE        2.0-3.0    APTT [[id number]]  (Normal) Collected: 01/20/25 1830    Lab Status: Final result Specimen: Blood from Arm, Left Updated: 01/20/25 1855     PTT 24 seconds     CBC and Platelet [[id number]]  (Normal) Collected: 01/20/25 1830    Lab Status: Final result Specimen: Blood from Arm, Left Updated: 01/20/25 1836     WBC 7.30 Thousand/uL      RBC 4.03 Million/uL      Hemoglobin 12.0 g/dL      Hematocrit 36.6 %      MCV 91 fL      MCH 29.8 pg      MCHC 32.8 g/dL      RDW 13.2 %      Platelets 217 Thousands/uL      MPV 9.2 fL     Fingerstick Glucose (POCT) [[id number]]  (Normal) Collected: 01/20/25 1816    Lab Status: Final result Specimen: Blood Updated: 01/20/25 1817     POC Glucose 115 mg/dl             CTA stroke alert (head/neck)   Final Interpretation by Jacob Jenkins MD (01/20 1821)      Moderate to high-grade stenosis involving the right posterior artery P2 segment, and mild stenosis of the P2 segment, mild stenosis of the distal right M1 segment and mild stenosis of a left middle cerebral artery proximal M2 segment due to    atherosclerotic plaque. No large vessel occlusion or intracranial aneurysm identified.      No hemodynamically significant stenosis,  dissection or occlusion identified on CT angiogram of the neck.         Findings were directly discussed with Simone Harrington at 6:17 p.m. on 1/20/2025..      Workstation performed: [id number]         CT stroke alert brain   Final Interpretation by Jacob Jenkins MD (01/20 1810)      No acute intracranial abnormality. Moderate chronic white matter microangiopathic changes. Old lacunar infarcts as described above.      Findings were directly discussed with Simone Harrington at approximately 6:10 p.m. on 1/20/2025.      Workstation performed: [id number]         MRI Inpatient Order    (Results Pending)   XR chest portable    (Results Pending)       Procedures    ED Medication and Procedure Management   Prior to Admission Medications   Prescriptions Last Dose Informant Patient Reported? Taking?   FLUoxetine (PROzac) 10 mg capsule 1/20/2025  Yes Yes   Sig: Take 10 mg by mouth daily   Multiple Vitamin (multivitamin) tablet 1/20/2025  Yes Yes   Sig: Take 1 tablet by mouth daily   amLODIPine (NORVASC) 5 mg tablet 1/20/2025  Yes Yes   Sig: Take 5 mg by mouth daily   atorvastatin (LIPITOR) 40 mg tablet 1/20/2025  Yes Yes   Sig: Take 40 mg by mouth daily   clopidogrel (PLAVIX) 75 mg tablet 1/20/2025  Yes Yes   Sig: Take 75 mg by mouth daily      Facility-Administered Medications: None     Current Discharge Medication List        CONTINUE these medications which have NOT CHANGED    Details   amLODIPine (NORVASC) 5 mg tablet Take 5 mg by mouth daily      atorvastatin (LIPITOR) 40 mg tablet Take 40 mg by mouth daily      clopidogrel (PLAVIX) 75 mg tablet Take 75 mg by mouth daily      FLUoxetine (PROzac) 10 mg capsule Take 10 mg by mouth daily      Multiple Vitamin (multivitamin) tablet Take 1 tablet by mouth daily           No discharge procedures on file.  ED SEPSIS DOCUMENTATION   Time reflects when diagnosis was documented in both MDM as applicable and the Disposition within this note       Time User Action Codes Description Comment     1/20/2025  6:03 PM Erne, Brendan Add [R29.90] Stroke-like symptoms     1/20/2025  7:25 PM Erne, Brendan Add [R42] Dizziness     1/20/2025  7:25 PM Erne, Brendan Modify [R29.90] Stroke-like symptoms     1/20/2025  7:25 PM Erne, Brendan Modify [R42] Dizziness     1/20/2025  7:25 PM Erne, Brendan Add [R07.9] Chest pain     1/20/2025  7:25 PM Erne, Brendan Add [R51.9] Acute headache                  Brendan Quick MD  01/20/25 3696

## 2025-01-21 NOTE — PLAN OF CARE
Problem: OCCUPATIONAL THERAPY ADULT  Goal: Performs self-care activities at highest level of function for planned discharge setting.  See evaluation for individualized goals.  Description: Treatment Interventions: ADL retraining, Functional transfer training, UE strengthening/ROM, Endurance training, Compensatory technique education, Energy conservation, Activityengagement          See flowsheet documentation for full assessment, interventions and recommendations.   Outcome: Progressing  Note: Limitation: Decreased ADL status, Decreased UE ROM, Decreased UE strength, Decreased Safe judgement during ADL, Decreased endurance, Decreased self-care trans, Decreased high-level ADLs  Prognosis: Good  Assessment: Patient is a 89 y.o. female seen for OT evaluation s/p admit to [facility] on 1/20/2025 w/Stroke-like symptoms. PMHx and Commorbidities affecting patient's functional performance at time of assessment include: hypertension, major depressive disorder, hypokalemia, and chest pain. Orders placed for OT evaluation and treatment.  Performed at least two patient identifiers during session including name and wristband. Pt reported residing in a one-story house with 4 HOLLI with family. Pt reported being indpenedent with ADLs and requiring assistance for IADLs. Pt reported ambulating with a rollator at baseline. Personal factors affecting patient at time of initial evaluation include: difficulty performing ADLs and difficulty performing IADLs. Upon evaluation, patient requires minimal  assist for UB ADLs, maximal assist for LB ADLs, transfers and functional ambulation in room and bathroom with minimal  assist x1, with the use of Rolling Walker.  Patient is oriented to name,, oriented to place,, oriented to situation,, and disoriented to time,. Occupational performance is affected by the following deficits: dynamic sit/ stand balance deficit with poor standing tolerance time for self care and functional  mobility, decreased activity tolerance, decreased safety awareness, and increased pain.  Patient to benefit from continued Occupational Therapy treatment while in the hospital to address deficits as defined above and maximize level of functional independence with ADLs and functional mobility. Occupational Performance areas to address include: eating, grooming , bathing/ shower, dressing, toilet hygiene, transfer to all surfaces, functional ambulation, and functional mobility. From OT standpoint, recommendation at time of d/c would be Level 3.     Rehab Resource Intensity Level, OT: III (Minimum Resource Intensity)

## 2025-01-21 NOTE — H&P
"H&P - Hospitalist   Name: Ibeth Burrell 89 y.o. female I MRN: [id number]  Unit/Bed#: 2 E 267-01 I Date of Admission: 1/20/2025   Date of Service: 1/20/2025 I Hospital Day: 0     Assessment & Plan  Stroke-like symptoms  Patient presented to the ED as a prehospital stroke alert.   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  She presented with dizziness at home and near syncope where she almost fell but her family caught her. They reported her \"eyes rolled back\" prior to this.  NIH was 1 on arrival with residual right UE deficit noted.  Neurology evaluated the patient and recommended aspirin load and continuing aspirin 81 mg and Plavix 75 mg daily starting tomorrow.  She will be admitted for stroke pathway  Continue statin  MRI, echo, lipids, A1c  Neuro checks per protocol  Neurology consulted, tele note reviewed  She failed nursing bedside swallow evaluation, will need official speech evaluation tomorrow before diet or meds   Permissive HTN up to 200, hold amlodipine  HTN (hypertension)  Hold home amlodipine to allow for permissive htn  Monitor BPs  MDD (major depressive disorder)  Hold home fluoxetine until speech clearance  Hypokalemia  Mild K 3.4  IV potassium chloride repletion ordered   Chest pain  Central chest discomfort that stated after her near fall when her family members caught her  Likely MSK given very tender to palpation  Will get a portable chest xray   Lidocaine patch and IV tylenol for symptom control  Negative trop noted      VTE Pharmacologic Prophylaxis:   Moderate Risk (Score 3-4) - Pharmacological DVT Prophylaxis Ordered: enoxaparin (Lovenox).  Code Status: Level 1 - Full Code dw pt  Discussion with family: Updated  (daughter) at bedside.    Anticipated Length of Stay: Patient will be admitted on an observation basis with an anticipated length of stay of less than 2 midnights secondary to stroke rule out.    History of Present Illness   Chief " Complaint: neurologic symptoms    Ibeth Burrell is a 89 y.o. female with a PMH of CVA on plavix, HTN, anxiety/MDD who presents with dizziness and near-syncopal episode at home. Patient was in her usual state of health with no recent illness. She attempted to ambulate in the kitchen when her daughter noticed her eyes rolled back and she caught her before she fell. She complained of reproducible chest pain since the near-fall. She has tenderness near the sternum. Did not fall or have any trauma. She was evaluated by tele-Neurology who recommended admission via stroke pathway. Daughter was present at bedside at the time of my evaluation. She denied any significant symptoms aside from her reproducible chest pain. No new neurologic deficits. She did fail dysphagia screen by nursing x 2.     Review of Systems    Historical Information   Past Medical History:   Diagnosis Date    Hypertension     Stroke (HCC)      No past surgical history on file.  Social History     Tobacco Use    Smoking status: Never    Smokeless tobacco: Never   Substance and Sexual Activity    Alcohol use: Not on file    Drug use: Not on file    Sexual activity: Not on file     E-Cigarette/Vaping     E-Cigarette/Vaping Substances     No family history on file.  Social History:  Marital Status:    Occupation: none  Patient Pre-hospital Living Situation: Home  Patient Pre-hospital Level of Mobility: walks  Patient Pre-hospital Diet Restrictions: none    Meds/Allergies   I have reviewed home medications with patient personally.  Prior to Admission medications    Medication Sig Start Date End Date Taking? Authorizing Provider   amLODIPine (NORVASC) 5 mg tablet Take 5 mg by mouth daily   Yes Historical Provider, MD   atorvastatin (LIPITOR) 40 mg tablet Take 40 mg by mouth daily   Yes Historical Provider, MD   clopidogrel (PLAVIX) 75 mg tablet Take 75 mg by mouth daily   Yes Historical Provider, MD   FLUoxetine (PROzac) 10 mg capsule Take 10 mg by  "mouth daily   Yes Historical Provider, MD   Multiple Vitamin (multivitamin) tablet Take 1 tablet by mouth daily   Yes Historical Provider, MD     No Known Allergies    Objective :  Temp:  [97.7 °F (36.5 °C)-98.3 °F (36.8 °C)] 98.3 °F (36.8 °C)  HR:  [56-67] 56  BP: (119-158)/(61-87) 145/70  Resp:  [15-20] 18  SpO2:  [91 %-96 %] 94 %  O2 Device: Nasal cannula    Physical Exam   NAD  Nonlabored resp on 2L   RRR, systolic murmur  NTND abd   Aaox3, Residual right upper and right lower ext weakness.     Lines/Drains:            Lab Results: I have reviewed the following results:  Results from last 7 days   Lab Units 01/20/25  1830   WBC Thousand/uL 7.30   HEMOGLOBIN g/dL 12.0   HEMATOCRIT % 36.6   PLATELETS Thousands/uL 217     Results from last 7 days   Lab Units 01/20/25  1830   SODIUM mmol/L 137   POTASSIUM mmol/L 3.4*   CHLORIDE mmol/L 105   CO2 mmol/L 26   BUN mg/dL 14   CREATININE mg/dL 0.48*   ANION GAP mmol/L 6   CALCIUM mg/dL 8.3*   GLUCOSE RANDOM mg/dL 122     Results from last 7 days   Lab Units 01/20/25  1830   INR  1.00     Results from last 7 days   Lab Units 01/20/25  1816   POC GLUCOSE mg/dl 115     No results found for: \"HGBA1C\"        Imaging Results Review: I reviewed radiology reports from this admission including: CT head.  Other Study Results Review: No additional pertinent studies reviewed.    Administrative Statements   I have spent a total time of 45+ minutes in caring for this patient on the day of the visit/encounter including Risk factor reductions, Impressions, Counseling / Coordination of care, Documenting in the medical record, Reviewing / ordering tests, medicine, procedures  , Obtaining or reviewing history  , and Communicating with other healthcare professionals .    ** Please Note: This note has been constructed using a voice recognition system. **    "

## 2025-01-21 NOTE — ASSESSMENT & PLAN NOTE
Central chest discomfort that stated after her near fall when her family members caught her  Likely MSK given very tender to palpation  Will get a portable chest xray   Lidocaine patch and IV tylenol for symptom control  Negative trop noted

## 2025-01-22 PROCEDURE — 99233 SBSQ HOSP IP/OBS HIGH 50: CPT | Performed by: PSYCHIATRY & NEUROLOGY

## 2025-01-22 PROCEDURE — 99232 SBSQ HOSP IP/OBS MODERATE 35: CPT | Performed by: PHYSICIAN ASSISTANT

## 2025-01-22 RX ORDER — AMLODIPINE BESYLATE 5 MG/1
5 TABLET ORAL DAILY
Status: DISCONTINUED | OUTPATIENT
Start: 2025-01-22 | End: 2025-01-24 | Stop reason: HOSPADM

## 2025-01-22 RX ORDER — FLUOXETINE 10 MG/1
10 CAPSULE ORAL DAILY
Status: DISCONTINUED | OUTPATIENT
Start: 2025-01-22 | End: 2025-01-24 | Stop reason: HOSPADM

## 2025-01-22 RX ADMIN — DICLOFENAC SODIUM 2 G: 10 GEL TOPICAL at 12:21

## 2025-01-22 RX ADMIN — DICLOFENAC SODIUM 2 G: 10 GEL TOPICAL at 21:14

## 2025-01-22 RX ADMIN — ACETAMINOPHEN 650 MG: 325 TABLET, FILM COATED ORAL at 21:35

## 2025-01-22 RX ADMIN — ACETAMINOPHEN 1000 MG: 1000 INJECTION, SOLUTION INTRAVENOUS at 08:56

## 2025-01-22 RX ADMIN — DICLOFENAC SODIUM 2 G: 10 GEL TOPICAL at 08:41

## 2025-01-22 RX ADMIN — ATORVASTATIN CALCIUM 40 MG: 40 TABLET, FILM COATED ORAL at 08:46

## 2025-01-22 RX ADMIN — ENOXAPARIN SODIUM 40 MG: 40 INJECTION SUBCUTANEOUS at 08:56

## 2025-01-22 RX ADMIN — CLOPIDOGREL 75 MG: 75 TABLET ORAL at 08:46

## 2025-01-22 RX ADMIN — FLUOXETINE HYDROCHLORIDE 10 MG: 10 CAPSULE ORAL at 15:49

## 2025-01-22 RX ADMIN — DICLOFENAC SODIUM 2 G: 10 GEL TOPICAL at 17:08

## 2025-01-22 RX ADMIN — ACETAMINOPHEN 1000 MG: 1000 INJECTION, SOLUTION INTRAVENOUS at 02:22

## 2025-01-22 RX ADMIN — AMLODIPINE BESYLATE 5 MG: 5 TABLET ORAL at 15:50

## 2025-01-22 NOTE — ASSESSMENT & PLAN NOTE
"Patient presented to the ED as a prehospital stroke alert.   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  She presented with dizziness at home and near syncope where she almost fell but her family caught her. They reported her \"eyes rolled back\" prior to this.  NIHSS was 1 on arrival with residual right UE deficit noted.  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; no acute dissection or occlusion noted   MRI brain no new infarcts    Stroke pathway initiated and subsequently discontinued given negative w/u  Neuro consulted - follow up PCP and continue secondary prophylaxis  PT, OT, SLP consulted - level 3 recs, HOWEVER, patient is requesting rehab as is daughter, due to difficulties at home with independence   "

## 2025-01-22 NOTE — CASE MANAGEMENT
Case Management Assessment & Discharge Planning Note    Patient name Ibeth Burrell  Location 2 [facility] 267/2 E 267-01 MRN [id number]  : 1935 Date 2025       Current Admission Date: 2025  Current Admission Diagnosis:Stroke-like symptoms   Patient Active Problem List    Diagnosis Date Noted Date Diagnosed    Stroke-like symptoms 2025     HTN (hypertension) 2025     MDD (major depressive disorder) 2025     Hypokalemia 2025     Chest pain 2025       LOS (days): 1  Geometric Mean LOS (GMLOS) (days): 1.9  Days to GMLOS:1.1     OBJECTIVE:    Risk of Unplanned Readmission Score: 10.38         Current admission status: Inpatient       Preferred Pharmacy:   [id number] - [facility] [place] - [address]  [address]  [facility] [place] [address]  Phone: [phone] Fax: [phone]    Primary Care Provider: No primary care provider on file.    Primary Insurance: MEDICARE  Secondary Insurance:     ASSESSMENT:  Active Health Care Proxies       HECTOR GREENBERG Health Care Agent - Daughter   Primary Phone: [phone] (Mobile)                 Advance Directives  Does patient have a Health Care POA?: Yes  Does patient have Advance Directives?: Yes  Advance Directives: Living will, Power of  for health care, Power of  for finance  Primary Contact: Kirt Barroso         Readmission Root Cause  30 Day Readmission: No    Patient Information  Admitted from:: Home  Mental Status: Alert  During Assessment patient was accompanied by: Not accompanied during assessment  Assessment information provided by:: Patient  Primary Caregiver: Self  Support Systems: Daughter  County of Residence: [place]  What city do you live in?: [place]  Home entry access options. Select all that apply.: Stairs  Number of steps to enter home.: 5  Do the steps have railings?: Yes  Type of Current Residence: [place] [facility]  Living Arrangements: Lives w/ Daughter  Is patient a  ?: Yes  [facility] with [facility] [facility]: No    Activities of Daily Living Prior to Admission  Functional Status: Assistance  Completes ADLs independently?: No  Level of ADL dependence: Assistance (Daughter helps her with the shower)  Ambulates independently?: Yes  Does patient use assisted devices?: Yes  Assisted Devices (DME) used: Walker, Shower Chair  Does patient currently own DME?: Yes  What DME does the patient currently own?: Walker, Shower Chair  Does patient have a history of Outpatient Therapy (PT/OT)?: No  Does the patient have a history of Short-Term Rehab?: Yes  Does patient have a history of HHC?: Yes  Does patient currently have HHC?: No    Current Home Health Care  Home Health Agency Name:: Other (referrals sent)    Patient Information Continued  Income Source: SSI/SSD  Does patient have prescription coverage?: Yes  Does patient receive dialysis treatments?: No  Does patient have a history of substance abuse?: No  Does patient have a history of Mental Health Diagnosis?: No         Means of Transportation  Means of Transport to Appts:: Family transport          DISCHARGE DETAILS:    Discharge planning discussed with:: patient  Freedom of Choice: Yes  Comments - Freedom of Choice: Patient wants to go home with [facility] or would go to a STR if accepted, PT level 111. [facility]  CM contacted family/caregiver?: No- see comments                  Requested Home Health Care         Is the patient interested in HHC at discharge?: Yes  Home Health Discipline requested:: Nursing, Occupational Therapy, Physical Therapy  Home Health Agency Name:: Other (referrals sent)  Home Health Follow-Up Provider:: PCP  Home Health Services Needed:: Evaluate Functional Status and Safety, Gait/ADL Training, Strengthening/Theraputic Exercises to Improve Function  Homebound Criteria Met:: Uses an Assist Device (i.e. cane, walker, etc)  Supporting Clincal Findings:: Limited Endurance    DME  Referral Provided  Referral made for DME?: No    Other Referral/Resources/Interventions Provided:  Referral Comments: Patient wants to go home with HHC or would go to a STR if accepted, PT level 111. Referrals sent forHC and STR         Treatment Team Recommendation: Home with Home Health Care, Short Term Rehab  Discharge Destination Plan:: Short Term Rehab, Home with Home Health Care  Transport at Discharge : Dagmar villalpando, Family

## 2025-01-22 NOTE — ASSESSMENT & PLAN NOTE
"Ibeth Burrell is an 89 year old woman with hx of prior stroke who presented to the hospital for evaluation of dizziness and N/V. Around 1630 on 1/20, pt began to have dizziness in addition to N/V and chest pain. Symptoms were persistent, which is why she came to the hospital for evaluation. 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 On Plavix at home. No other noted symptoms. Reported to have significant improvement in symptoms after return from CT scan.  She described her dizziness as room spinning.  No other reported focal neurological complaints or findings on examination.   - NIHSS 1 (has residual RUE drift from prior stroke, at baseline)  - LKW 1630  - The patient was not a tnk or thrombectomy candidate.     CT of head - \"No acute intracranial abnormality. Moderate chronic white matter microangiopathic changes. Old lacunar infarcts as described above. :  CTA of head and neck - \"Moderate to high-grade stenosis involving the right posterior artery P2 segment, and mild stenosis of the P2 segment, mild stenosis of the distal right M1 segment and mild stenosis of a left middle cerebral artery proximal M2 segment due to atherosclerotic plaque. No large vessel occlusion or intracranial aneurysm identified.  No hemodynamically significant stenosis, dissection or occlusion identified on CT angiogram of the neck.\"    Vital signs arrival temperature was 97.7 blood pressure was 158/87 SpO2 was 96.    Labs/Testing:  Glucose 115  BMP with potassium of 3.4, calcium 8.3  CBC was normal  INR was normal  APTT were normal  Trop was normal  LDL was 63  A1c 5.4  Mg was 1.4L    MRI of brain - \"White matter changes suggestive of chronic microangiopathy. Scattered remote lacunar infarcts are also noted. No acute intracranial pathology. \"  Echo -  EF is 60%, bilateral atrium are dilated.    - Stroke pathway  Loaded with aspirin, currently on home Plavix.  Continue home Plavix and statin, no acute " stroke seen on imaging.   Atorvastatin 40 mg  Continue telemetry  PT/OT/ST  Frequent neuro checks. Continue to monitor and notify neurology with any changes.   Vestibular therapy as out patient, meclizine prn  - Medical management and supportive care per primary team. Correction of any metabolic or infectious disturbances.    - Reviewed with attending plan of care per attending physician.  Please see attestation for completed details.   Acted as scribe in this case.

## 2025-01-22 NOTE — ASSESSMENT & PLAN NOTE
Central chest discomfort that stated after her near fall when her family members caught her  Likely MSK given very tender to palpation  CXR negative   Lidocaine patch ineffective, IV tylenol helping   Continue topical voltaren gel

## 2025-01-22 NOTE — PROGRESS NOTES
"Progress Note - Hospitalist   Name: Ibeth Burrell 89 y.o. female I MRN: [id number]  Unit/Bed#: 2 E 267-01 I Date of Admission: 1/20/2025   Date of Service: 1/22/2025 I Hospital Day: 1    Assessment & Plan  Stroke-like symptoms  Patient presented to the ED as a prehospital stroke alert.   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  She presented with dizziness at home and near syncope where she almost fell but her family caught her. They reported her \"eyes rolled back\" prior to this.  NIHSS was 1 on arrival with residual right UE deficit noted.  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; no acute dissection or occlusion noted   MRI brain no new infarcts    Stroke pathway initiated and subsequently discontinued given negative w/u  Neuro consulted - follow up PCP and continue secondary prophylaxis  PT, OT, SLP consulted - level 3 recs, HOWEVER, patient is requesting rehab as is daughter, due to difficulties at home with independence   HTN (hypertension)  Resume home dose amlodipine   Monitor VS per stroke protocol   Blood Pressure: 147/73   MDD (major depressive disorder)  Resume fluoxetine   Hypokalemia  Mild K 3.4, s/p IV replacement   Chest pain  Central chest discomfort that stated after her near fall when her family members caught her  Likely MSK given very tender to palpation  CXR negative   Lidocaine patch ineffective, IV tylenol helping   Continue topical voltaren gel    VTE Pharmacologic Prophylaxis: VTE Score: 3 Moderate Risk (Score 3-4) - Pharmacological DVT Prophylaxis Ordered: enoxaparin (Lovenox).    Mobility:   Basic Mobility Inpatient Raw Score: 17  JH-HLM Goal: 5: Stand one or more mins  JH-HLM Achieved: 3: Sit at edge of bed  JH-HLM Goal NOT achieved. Continue with multidisciplinary rounding and encourage appropriate mobility to improve upon JH-HLM goals.    Patient Centered Rounds: I performed bedside rounds " with nursing staff today.   Discussions with Specialists or Other Care Team Provider: neuro, CM    Education and Discussions with Family / Patient: Updated  (daughter) via phone.    Current Length of Stay: 1 day(s)  Current Patient Status: Inpatient   Certification Statement: The patient will continue to require additional inpatient hospital stay due to discharge planning   Discharge Plan: Anticipate discharge tomorrow to rehab facility.    Code Status: Level 1 - Full Code    Subjective   Patient doing well, she reports that she agrees with her daughter in the need for rehab. No new issues overnight. Talked a bit about her visual difficulties in the setting of macular degeneration.     Objective :  Temp:  [97.8 °F (36.6 °C)-98.2 °F (36.8 °C)] 97.8 °F (36.6 °C)  HR:  [54-62] 59  BP: (124-167)/(67-83) 147/73  Resp:  [18] 18  SpO2:  [93 %-97 %] 94 %  O2 Device: None (Room air)    Body mass index is 24.79 kg/m².     Input and Output Summary (last 24 hours):   No intake or output data in the 24 hours ending 01/22/25 1421    Physical Exam  Vitals and nursing note reviewed.   Constitutional:       General: She is awake. She is not in acute distress.     Appearance: Normal appearance. She is well-developed. She is not ill-appearing or toxic-appearing.   HENT:      Head: Normocephalic and atraumatic.   Eyes:      Conjunctiva/sclera: Conjunctivae normal.   Cardiovascular:      Rate and Rhythm: Normal rate and regular rhythm.   Pulmonary:      Effort: Pulmonary effort is normal. No respiratory distress.      Breath sounds: No wheezing.   Abdominal:      General: There is no distension.   Musculoskeletal:      Right lower leg: No edema.      Left lower leg: No edema.   Skin:     General: Skin is warm and dry.      Coloration: Skin is not jaundiced or pale.   Neurological:      Mental Status: She is alert and oriented to person, place, and time.      Motor: No weakness (RUE, baseline).   Psychiatric:         Mood  and Affect: Mood normal.         Behavior: Behavior normal. Behavior is cooperative.           Lines/Drains:              Lab Results: I have reviewed the following results:   Results from last 7 days   Lab Units 01/21/25  0513   WBC Thousand/uL 7.45   HEMOGLOBIN g/dL 12.0   HEMATOCRIT % 35.8   PLATELETS Thousands/uL 183   SEGS PCT % 70   LYMPHO PCT % 18   MONO PCT % 9   EOS PCT % 3     Results from last 7 days   Lab Units 01/21/25  0513   SODIUM mmol/L 141   POTASSIUM mmol/L 3.5   CHLORIDE mmol/L 109*   CO2 mmol/L 26   BUN mg/dL 14   CREATININE mg/dL 0.42*   ANION GAP mmol/L 6   CALCIUM mg/dL 8.7   GLUCOSE RANDOM mg/dL 98     Results from last 7 days   Lab Units 01/20/25  1830   INR  1.00     Results from last 7 days   Lab Units 01/20/25  1816   POC GLUCOSE mg/dl 115     Results from last 7 days   Lab Units 01/21/25  0513   HEMOGLOBIN A1C % 5.4           Recent Cultures (last 7 days):         Imaging Results Review: No pertinent imaging studies reviewed.  Other Study Results Review: No additional pertinent studies reviewed.    Last 24 Hours Medication List:     Current Facility-Administered Medications:     acetaminophen (Ofirmev) injection 1,000 mg, Q6H PRN, Last Rate: 1,000 mg (01/22/25 0856)    acetaminophen (TYLENOL) tablet 650 mg, Q6H PRN    atorvastatin (LIPITOR) tablet 40 mg, Daily    clopidogrel (PLAVIX) tablet 75 mg, Daily    Diclofenac Sodium (VOLTAREN) 1 % topical gel 2 g, 4x Daily    enoxaparin (LOVENOX) subcutaneous injection 40 mg, Daily    iohexol (OMNIPAQUE) 350 MG/ML injection (MULTI-DOSE) 85 mL, Once in imaging    meclizine (ANTIVERT) tablet 25 mg, Q8H PRN    ondansetron (ZOFRAN) injection 4 mg, Q4H PRN    polyethylene glycol (MIRALAX) packet 17 g, Daily PRN    Administrative Statements   Today, Patient Was Seen By: Nika Gallego PA-C  I have spent a total time of 40 minutes in caring for this patient on the day of the visit/encounter including Patient and family education, Counseling /  Coordination of care, Documenting in the medical record, Reviewing / ordering tests, medicine, procedures  , and Communicating with other healthcare professionals .    **Please Note: This note may have been constructed using a voice recognition system.**

## 2025-01-22 NOTE — PROGRESS NOTES
"Progress Note - Neurology   Name: Ibeth Burrell 89 y.o. female I MRN: [id number]  Unit/Bed#: 2 E 267-01 I Date of Admission: 1/20/2025   Date of Service: 1/22/2025 I Hospital Day: 1     Assessment & Plan  Stroke-like symptoms  Ibeth Burrell is an 89 year old woman with hx of prior stroke who presented to the hospital for evaluation of dizziness and N/V. Around 1630 on 1/20, pt began to have dizziness in addition to N/V and chest pain. Symptoms were persistent, which is why she came to the hospital for evaluation. 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 On Plavix at home. No other noted symptoms. Reported to have significant improvement in symptoms after return from CT scan.  She described her dizziness as room spinning.  No other reported focal neurological complaints or findings on examination.   - NIHSS 1 (has residual RUE drift from prior stroke, at baseline)  - LKW 1630  - The patient was not a tnk or thrombectomy candidate.     CT of head - \"No acute intracranial abnormality. Moderate chronic white matter microangiopathic changes. Old lacunar infarcts as described above. :  CTA of head and neck - \"Moderate to high-grade stenosis involving the right posterior artery P2 segment, and mild stenosis of the P2 segment, mild stenosis of the distal right M1 segment and mild stenosis of a left middle cerebral artery proximal M2 segment due to atherosclerotic plaque. No large vessel occlusion or intracranial aneurysm identified.  No hemodynamically significant stenosis, dissection or occlusion identified on CT angiogram of the neck.\"    Vital signs arrival temperature was 97.7 blood pressure was 158/87 SpO2 was 96.    Labs/Testing:  Glucose 115  BMP with potassium of 3.4, calcium 8.3  CBC was normal  INR was normal  APTT were normal  Trop was normal  LDL was 63  A1c 5.4  Mg was 1.4L    MRI of brain - \"White matter changes suggestive of chronic microangiopathy. Scattered " "remote lacunar infarcts are also noted. No acute intracranial pathology. \"  Echo -  EF is 60%, bilateral atrium are dilated.    - Stroke pathway  Loaded with aspirin, currently on home Plavix.  Continue home Plavix and statin, no acute stroke seen on imaging.   Atorvastatin 40 mg  Continue telemetry  PT/OT/ST  Frequent neuro checks. Continue to monitor and notify neurology with any changes.   Vestibular therapy as out patient, meclizine prn  - Medical management and supportive care per primary team. Correction of any metabolic or infectious disturbances.    - Reviewed with attending plan of care per attending physician.  Please see attestation for completed details.   Acted as scribe in this case.     Subjective   Neurology follow up.  MRI negative for acute stroke.   + cp, dizziness improved. Almost resolved.   No new focal neurological complaints.     Objective :  Temp:  [97.8 °F (36.6 °C)-98.2 °F (36.8 °C)] 97.8 °F (36.6 °C)  HR:  [54-62] 54  BP: (124-167)/(67-83) 167/83  Resp:  [18] 18  SpO2:  [93 %-97 %] 96 %  O2 Device: None (Room air)    Current Facility-Administered Medications   Medication Dose Route Frequency Provider Last Rate    acetaminophen  1,000 mg Intravenous Q6H PRN Alvin Ventura MD 1,000 mg (01/22/25 0222)    acetaminophen  650 mg Oral Q6H PRN Nika Gallego PA-C      atorvastatin  40 mg Oral Daily Alvin Ventura MD      clopidogrel  75 mg Oral Daily Alvin Ventura MD      Diclofenac Sodium  2 g Topical 4x Daily Nika Gallego PA-C      enoxaparin  40 mg Subcutaneous Daily Alvin Ventura MD      iohexol  85 mL Intravenous Once in imaging Brendan Quick MD      meclizine  25 mg Oral Q8H PRN Nika Gallego PA-C      ondansetron  4 mg Intravenous Q4H PRN Nika Gallego PA-C      polyethylene glycol  17 g Oral Daily PRN Nika Gallego PA-C        Neurological  Mental status - the patient is awake alert oriented x 3, with no evidence of aphasia or dysarthria, patient is able to follow simple commands, is able to " follow complex commands.  No para-phasic errors noted.    Cranial nerves 2 through 12 are intact - VF intact, no facial droop noted, V1-V3 intact.   Motor - non focal in the uppers or lowers, no drift noted.  No nystagmus noted.   No tremor observed.   Except right upper weaker at shoulder,  normal.  Similar to yesterday.  Sensation - non-focal to touch, no neglect noted.   Coordination -  no ataxia noted on finger-to-nose on the right upper, left upper difficult secondary to weakness.   Toes are equivocal bilaterally  No evidence of clonus or myoclonus or tremor.  No evidence of seizure activity, observed.       (Examined alongside attending)      Lab Results: I have reviewed the following results:    Recent Results (from the past 24 hours)   Echo complete w/ contrast if indicated    Collection Time: 01/21/25 10:36 AM   Result Value Ref Range    BSA 1.75 m2    A4C EF 62 %    LVOT stroke volume 190.36     LVOT stroke volume index 109.10 ml/m2    LVIDd 3.70 cm    LVIDS 2.40 cm    IVSd 1.60 cm    LVPWd 1.10 cm    LVOT diameter 2.5 cm    LVOT area 4.91     LVOT peak VTI 38.8 cm    FS 35 28 - 44    MV E' Tissue Velocity Septal 4 cm/s    LA Volume Index (BP) 33.1 mL/m2    E/A ratio 0.61     E wave deceleration time 454 ms    MV Peak E Saw 99 cm/s    MV Peak A Saw 1.63 m/s    AV LVOT peak gradient 8 mmHg    LVOT peak saw 1.39 m/s    RVID d 3.0 cm    Tricuspid annular plane systolic excursion 2.10 cm    LA size 5.6 cm    LA/Ao Ratio 2D 1.44     LA volume (BP) 58 mL    Ao VTI 65.7 cm    AV mean gradient 18 mmHg    LVOT mn grad 5.0 mmHg    AV peak gradient 30 mmHg    AV regurgitation pressure 1/2 time 385 ms    AV area by cont VTI 2.9 cm2    AV area peak saw 2.5 cm2    MV mean gradient antegrade 3 mmHg    MV peak gradient antegrade 10 mmHg    MV VTI 45.5 cm    MV stenosis pressure 1/2 time 130 ms    MV valve area p 1/2 method 1.69     TR Peak Saw 2.5 m/s    Triscuspid Valve Regurgitation Peak Gradient 25.0 mmHg    Ao  root 3.90 cm    Asc Ao 4.2 cm    AV peak gradient 41 mmHg    Aortic valve mean velocity 19.80 m/s    Tricuspid valve peak regurgitation velocity 2.48 m/s    Left ventricular stroke volume (2D) 36.00 mL    IVS 1.6 cm    LEFT VENTRICLE SYSTOLIC VOLUME (MOD BIPLANE) 2D 20 mL    LV DIASTOLIC VOLUME (MOD BIPLANE) 2D 57 mL    Left Atrium Area-systolic Four Chamber 22.1 cm2    Left Atrium Area-systolic Apical Two Chamber 17.7 cm2    LVSV, 2D 36 mL    DVI 0.59     AV valve area 2.90 cm2    MV valve area by continuity eq 4.18 cm2    LV EF 60     Peak gradient (Rest) 9.0 mmHg     Procedure: MRI brain wo contrast  Result Date: 1/21/2025  Narrative: MRI BRAIN WITHOUT CONTRAST INDICATION: Stroke.   Right upper extremity motor deficit, hypertension COMPARISON:   CT from the same day TECHNIQUE:  Multiplanar, multisequence imaging of the brain was performed. IMAGE QUALITY:  Diagnostic. FINDINGS: BRAIN PARENCHYMA:  There is no discrete mass, mass effect or midline shift. Scattered susceptibility foci suggesting chronic microhemorrhage. There is no acute intracranial hemorrhage. There are remote lacunar type infarcts in both cerebellar hemispheres, the left brachium pontis, and the right frontal periventricular white matter. There is a small area of encephalomalacia in the right occipital lobe. There is no evidence of acute infarction and diffusion imaging is unremarkable.  Small scattered hyperintensities on T2/FLAIR imaging are noted in the periventricular and subcortical white matter demonstrating an appearance that is statistically most likely to represent moderate microangiopathic change. VENTRICLES:  Normal for the patient's age. SELLA AND PITUITARY GLAND:  Normal. ORBITS:  Normal. PARANASAL SINUSES:  Normal. VASCULATURE:  Evaluation of the major intracranial vasculature demonstrates appropriate flow voids. CALVARIUM AND SKULL BASE:  Normal. EXTRACRANIAL SOFT TISSUES:  Normal.     Impression: White matter changes suggestive of  chronic microangiopathy. Scattered remote lacunar infarcts are also noted. No acute intracranial pathology. Workstation performed: [id number]     Procedure: Echo complete w/ contrast if indicated  Result Date: 1/21/2025  Narrative:   Technically difficult study   Left Ventricle: Left ventricular cavity size is normal. Wall thickness is not well visualized. The left ventricular ejection fraction is 60% by visual estimation. Systolic function is normal. Although no diagnostic regional wall motion abnormality was identified, this possibility cannot be completely excluded on the basis of this study. There is  outflow tract dynamic obstruction at rest with a peak gradient of 9.0 mmHg which is secondary to septal prominence in conjunction with calcification of the mitral leaflet tips   Left Atrium: The atrium is dilated.   Right Atrium: The atrium is dilated.   Aortic Valve: There is mild to moderate regurgitation. There is mild stenosis. The aortic valve mean gradient is 18 mmHg. The dimensionless velocity index is 0.59.   Mitral Valve: There is moderate focal thickening of the anterior leaflet. There is annular calcification. There is mild regurgitation. There is mild stenosis. The mitral valve mean gradient is 3mmHg.   Aorta: The aortic root is 3.90 cm. The ascending aorta is 4.2 cm.  Which is dilated     Procedure: XR chest portable  Result Date: 1/21/2025  Narrative: XR CHEST PORTABLE INDICATION: O2 requirement rule out signs of pneumonia or fluid. Sternal pain with movement. COMPARISON: CTA neck 1/20/2025. FINDINGS: Mild right base opacity. No pneumothorax or pleural effusion. Mild cardiomegaly. Bones are unremarkable for age. Right shoulder arthroplasty. Normal upper abdomen.     Impression: Mild right base opacity which could be due to atelectasis. Pneumonia not excluded in the appropriate clinical setting. Workstation performed: [id number]     Procedure: CTA stroke alert (head/neck)  Result Date:  1/20/2025  Narrative: CTA NECK AND BRAIN WITH CONTRAST INDICATION: Stroke Alert COMPARISON:   Head CT from 1/28/2025, 8/10/2024 TECHNIQUE:  Post contrast imaging was performed after administration of iodinated contrast through the neck and brain. Post contrast axial 0.625 mm images timed to opacify the arterial system.  3D rendering was performed on an independent workstation.   MIP reconstructions performed. Coronal and sagittal reconstructions were performed of the non contrast portion of the brain. Radiation dose length product (DLP) for this visit:  479 mGy-cm .  This examination, like all CT scans performed in the [facility], was performed utilizing techniques to minimize radiation dose exposure, including the use of iterative reconstruction and automated exposure control. IV Contrast: Administered IMAGE QUALITY:   Diagnostic FINDINGS: CTA NECK ARCH AND GREAT VESSELS: Mild aneurysmal dilatation of the visualized ascending aorta measuring up to 4.0 cm in maximal dimensions. Configuration of the great vessel origins is typical. Mixed atherosclerotic plaque involving the visualized aortic arch extending into the great vessel origins, without stenosis. VERTEBRAL ARTERIES: Mild calcified atherosclerotic plaque at the origin of the left vertebral artery without stenosis. No right vertebral artery stenosis. No cervical vertebral artery occlusion or dissection identified. RIGHT CAROTID: Mixed atherosclerotic plaque involving the right carotid bifurcation/carotid bulb, without stenosis. Tortuous proximal to mid right cervical internal carotid artery, extending into the right parapharyngeal soft tissues, with mild vessel kinking in this region. No occlusion, high-grade stenosis or dissection identified. LEFT CAROTID: Mixed atherosclerotic plaque involving the left carotid bulb/bifurcation, resulting in approximately 20 to 30% stenosis. No occlusion.   No dissection. NASCET criteria was used to determine  the degree of internal carotid artery diameter stenosis. CTA BRAIN: INTERNAL CAROTID ARTERIES: Calcified atherosclerotic plaque involving the bilateral internal carotid siphons. No stenosis. No occlusion. ANTERIOR CEREBRAL ARTERY CIRCULATION:  No stenosis or occlusion. 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 MIDDLE CEREBRAL ARTERY CIRCULATION: Mild stenosis involving the right middle cerebral artery M1 segment due to mixed atherosclerotic plaque. Mild stenosis of the proximal left middle cerebral artery M2 segment. No high-grade stenosis or occlusion identified. DISTAL VERTEBRAL ARTERIES: Mixed atherosclerotic plaque involving the intradural vertebral arteries, resulting in at most mild stenosis. No occlusion. BASILAR ARTERY: Mild mixed atherosclerotic plaque involving the proximal basilar artery, without significant stenosis or occlusion. 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 Moderate to high-grade stenosis of the right posterior cerebral artery P2 segment, and mild stenosis of the left posterior cerebral artery P2 segment VENOUS STRUCTURES: Not well evaluated on this study. NON VASCULAR ANATOMY BONY STRUCTURES: Multilevel cervical and upper thoracic spondylosis. Diffuse osteopenia. No fractures are identified. SOFT TISSUES OF THE NECK: Multiple tiny bilateral thyroid nodules for which no further imaging evaluation or follow-up is needed given the size and appearance. THORACIC INLET:  Unremarkable.     Impression: Moderate to high-grade stenosis involving the right posterior artery P2 segment, and mild stenosis of the P2 segment, mild stenosis of the distal right M1 segment and mild stenosis of a left middle cerebral artery proximal M2 segment due to atherosclerotic plaque. No large vessel occlusion or intracranial aneurysm  identified. No hemodynamically significant stenosis, dissection or occlusion identified on CT angiogram of the neck. Findings were directly discussed with Simone Harrington at 6:17 p.m. on 1/20/2025.. Workstation performed: [id number]     Procedure: CT stroke alert brain  Result Date: 1/20/2025  Narrative: CT BRAIN - STROKE ALERT PROTOCOL INDICATION:   Stroke Alert. COMPARISON: 8/10/2024 TECHNIQUE:  CT examination of the brain was performed.  In addition to axial images, coronal reformatted images were created and submitted for interpretation. Radiation dose length product (DLP) for this visit:  927 mGy-cm .  This examination, like all CT scans performed in the [facility], was performed utilizing techniques to minimize radiation dose exposure, including the use of iterative reconstruction and automated exposure control. IMAGE QUALITY:  Diagnostic. FINDINGS: PARENCHYMA: Decreased attenuation is noted in periventricular and subcortical white matter demonstrating an appearance that is statistically most likely to represent moderate microangiopathic change. Old lacunar infarcts involving the periventricular white matter of the right frontal lobe. No CT signs of acute infarction.  No intracranial mass, mass effect or midline shift.  No acute parenchymal hemorrhage. Atherosclerotic calcifications involving the internal carotid siphons, the intradural vertebral arteries and the basilar artery. VENTRICLES AND EXTRA-AXIAL SPACES: No hydrocephalus noted. VISUALIZED ORBITS: Normal visualized orbits. PARANASAL SINUSES: Normal visualized paranasal sinuses. CALVARIUM AND EXTRACRANIAL SOFT TISSUES:   Normal.     Impression: No acute intracranial abnormality. Moderate chronic white matter microangiopathic changes. Old lacunar infarcts as described above. Findings were directly discussed with Simone Harrington at approximately 6:10 p.m. on 1/20/2025. Workstation performed: [id number]      Reviewed case with neurology  attending, history and physical examination, labs and imaging completed, plan of care as per attending physician.  Please see attestation for further details.  Examined alongside attending physician.   Acted as scribe in this case.

## 2025-01-22 NOTE — PLAN OF CARE
Problem: Prexisting or High Potential for Compromised Skin Integrity  Goal: Skin integrity is maintained or improved  Description: INTERVENTIONS:  - Identify patients at risk for skin breakdown  - Assess and monitor skin integrity  - Assess and monitor nutrition and hydration status  - Monitor labs   - Assess for incontinence   - Turn and reposition patient  - Assist with mobility/ambulation  - Relieve pressure over bony prominences  - Avoid friction and shearing  - Provide appropriate hygiene as needed including keeping skin clean and dry  - Evaluate need for skin moisturizer/barrier cream  - Collaborate with interdisciplinary team   - Patient/family teaching  - Consider wound care consult   Outcome: Progressing     Problem: Activity Intolerance/Impaired Mobility  Goal: Mobility/activity is maintained at optimum level for patient  Description: Interventions:  - Assess and monitor patient  barriers to mobility and need for assistive/adaptive devices.  - Assess patient's emotional response to limitations.  - Collaborate with interdisciplinary team and initiate plans and interventions as ordered.  - Encourage independent activity per ability.  - Maintain proper body alignment.  - Perform active/passive rom as tolerated/ordered.  - Plan activities to conserve energy.  - Turn patient as appropriate  Outcome: Progressing     Problem: Communication Impairment  Goal: Ability to express needs and understand communication  Description: Assess patient's communication skills and ability to understand information.  Patient will demonstrate use of effective communication techniques, alternative methods of communication and understanding even if not able to speak.     - Encourage communication and provide alternate methods of communication as needed.  - Collaborate with case management/ for discharge needs.  - Include patient/family/caregiver in decisions related to communication.  Outcome: Progressing      Problem: Potential for Aspiration  Goal: Non-ventilated patient's risk of aspiration is minimized  Description: Assess and monitor vital signs, respiratory status, and labs (WBC).  Monitor for signs of aspiration (tachypnea, cough, rales, wheezing, cyanosis, fever).    - Assess and monitor patient's ability to swallow.  - Place patient up in chair to eat if possible.  - HOB up at 90 degrees to eat if unable to get patient up into chair.  - Supervise patient during oral intake.   - Instruct patient/ family to take small bites.  - Instruct patient/ family to take small single sips when taking liquids.  - Follow patient-specific strategies generated by speech pathologist.  Outcome: Progressing  Goal: Ventilated patient's risk of aspiration is minimized  Description: Assess and monitor vital signs, respiratory status, airway cuff pressure, and labs (WBC).  Monitor for signs of aspiration (tachypnea, cough, rales, wheezing, cyanosis, fever).    - Elevate head of bed 30 degrees if patient has tube feeding.  - Monitor tube feeding.  Outcome: Progressing

## 2025-01-23 ENCOUNTER — APPOINTMENT (INPATIENT)
Dept: RADIOLOGY | Facility: HOSPITAL | Age: 89
DRG: 072 | End: 2025-01-23
Payer: MEDICARE

## 2025-01-23 LAB
BACTERIA UR QL AUTO: ABNORMAL /HPF
BILIRUB UR QL STRIP: NEGATIVE
CLARITY UR: CLEAR
COLOR UR: ABNORMAL
CREAT UR-MCNC: 61.8 MG/DL
GLUCOSE UR STRIP-MCNC: NEGATIVE MG/DL
HGB UR QL STRIP.AUTO: NEGATIVE
KETONES UR STRIP-MCNC: NEGATIVE MG/DL
LEUKOCYTE ESTERASE UR QL STRIP: NEGATIVE
MICROALBUMIN UR-MCNC: 23.8 MG/L
MICROALBUMIN/CREAT 24H UR: 39 MG/G CREATININE (ref 0–30)
MUCOUS THREADS UR QL AUTO: ABNORMAL
NITRITE UR QL STRIP: NEGATIVE
NON-SQ EPI CELLS URNS QL MICRO: ABNORMAL /HPF
PH UR STRIP.AUTO: 7 [PH]
PROT UR STRIP-MCNC: NEGATIVE MG/DL
RBC #/AREA URNS AUTO: ABNORMAL /HPF
SP GR UR STRIP.AUTO: 1.01 (ref 1–1.03)
UROBILINOGEN UR STRIP-ACNC: <2 MG/DL
WBC #/AREA URNS AUTO: ABNORMAL /HPF

## 2025-01-23 PROCEDURE — 71045 X-RAY EXAM CHEST 1 VIEW: CPT

## 2025-01-23 PROCEDURE — 99232 SBSQ HOSP IP/OBS MODERATE 35: CPT | Performed by: PHYSICIAN ASSISTANT

## 2025-01-23 PROCEDURE — 82043 UR ALBUMIN QUANTITATIVE: CPT | Performed by: PHYSICIAN ASSISTANT

## 2025-01-23 PROCEDURE — 81001 URINALYSIS AUTO W/SCOPE: CPT | Performed by: PHYSICIAN ASSISTANT

## 2025-01-23 PROCEDURE — 82570 ASSAY OF URINE CREATININE: CPT | Performed by: PHYSICIAN ASSISTANT

## 2025-01-23 RX ORDER — ACETAMINOPHEN 325 MG/1
975 TABLET ORAL EVERY 8 HOURS SCHEDULED
Status: DISCONTINUED | OUTPATIENT
Start: 2025-01-23 | End: 2025-01-24 | Stop reason: HOSPADM

## 2025-01-23 RX ORDER — MAGNESIUM SULFATE HEPTAHYDRATE 40 MG/ML
4 INJECTION, SOLUTION INTRAVENOUS ONCE
Status: COMPLETED | OUTPATIENT
Start: 2025-01-23 | End: 2025-01-23

## 2025-01-23 RX ORDER — ACETAMINOPHEN 325 MG/1
650 TABLET ORAL 2 TIMES DAILY PRN
Status: DISCONTINUED | OUTPATIENT
Start: 2025-01-23 | End: 2025-01-24 | Stop reason: HOSPADM

## 2025-01-23 RX ADMIN — ACETAMINOPHEN 975 MG: 325 TABLET, FILM COATED ORAL at 21:24

## 2025-01-23 RX ADMIN — CLOPIDOGREL 75 MG: 75 TABLET ORAL at 09:52

## 2025-01-23 RX ADMIN — FLUOXETINE HYDROCHLORIDE 10 MG: 10 CAPSULE ORAL at 09:56

## 2025-01-23 RX ADMIN — DICLOFENAC SODIUM 2 G: 10 GEL TOPICAL at 12:07

## 2025-01-23 RX ADMIN — ENOXAPARIN SODIUM 40 MG: 40 INJECTION SUBCUTANEOUS at 09:55

## 2025-01-23 RX ADMIN — DICLOFENAC SODIUM 2 G: 10 GEL TOPICAL at 21:26

## 2025-01-23 RX ADMIN — ATORVASTATIN CALCIUM 40 MG: 40 TABLET, FILM COATED ORAL at 09:52

## 2025-01-23 RX ADMIN — ACETAMINOPHEN 650 MG: 325 TABLET, FILM COATED ORAL at 09:53

## 2025-01-23 RX ADMIN — B-COMPLEX W/ C & FOLIC ACID TAB 1 TABLET: TAB at 09:52

## 2025-01-23 RX ADMIN — DICLOFENAC SODIUM 2 G: 10 GEL TOPICAL at 17:14

## 2025-01-23 RX ADMIN — ACETAMINOPHEN 975 MG: 325 TABLET, FILM COATED ORAL at 14:37

## 2025-01-23 RX ADMIN — DICLOFENAC SODIUM 2 G: 10 GEL TOPICAL at 09:56

## 2025-01-23 RX ADMIN — Medication 2.5 MG: at 12:37

## 2025-01-23 RX ADMIN — AMLODIPINE BESYLATE 5 MG: 5 TABLET ORAL at 09:52

## 2025-01-23 RX ADMIN — MAGNESIUM SULFATE HEPTAHYDRATE 4 G: 40 INJECTION, SOLUTION INTRAVENOUS at 11:52

## 2025-01-23 NOTE — PLAN OF CARE
Problem: Prexisting or High Potential for Compromised Skin Integrity  Goal: Skin integrity is maintained or improved  Description: INTERVENTIONS:  - Identify patients at risk for skin breakdown  - Assess and monitor skin integrity  - Assess and monitor nutrition and hydration status  - Monitor labs   - Assess for incontinence   - Turn and reposition patient  - Assist with mobility/ambulation  - Relieve pressure over bony prominences  - Avoid friction and shearing  - Provide appropriate hygiene as needed including keeping skin clean and dry  - Evaluate need for skin moisturizer/barrier cream  - Collaborate with interdisciplinary team   - Patient/family teaching  - Consider wound care consult   Outcome: Progressing     Problem: Neurological Deficit  Goal: Neurological status is stable or improving  Description: Interventions:  - Monitor and assess patient's level of consciousness, motor function, sensory function, and level of assistance needed for ADLs.   - Monitor and report changes from baseline. Collaborate with interdisciplinary team to initiate plan and implement interventions as ordered.   - Provide and maintain a safe environment.  - Consider seizure precautions.  - Consider fall precautions.  - Consider aspiration precautions.  - Consider bleeding precautions.  Outcome: Progressing     Problem: Activity Intolerance/Impaired Mobility  Goal: Mobility/activity is maintained at optimum level for patient  Description: Interventions:  - Assess and monitor patient  barriers to mobility and need for assistive/adaptive devices.  - Assess patient's emotional response to limitations.  - Collaborate with interdisciplinary team and initiate plans and interventions as ordered.  - Encourage independent activity per ability.  - Maintain proper body alignment.  - Perform active/passive rom as tolerated/ordered.  - Plan activities to conserve energy.  - Turn patient as appropriate  Outcome: Progressing     Problem:  Communication Impairment  Goal: Ability to express needs and understand communication  Description: Assess patient's communication skills and ability to understand information.  Patient will demonstrate use of effective communication techniques, alternative methods of communication and understanding even if not able to speak.     - Encourage communication and provide alternate methods of communication as needed.  - Collaborate with case management/ for discharge needs.  - Include patient/family/caregiver in decisions related to communication.  Outcome: Progressing     Problem: Potential for Aspiration  Goal: Non-ventilated patient's risk of aspiration is minimized  Description: Assess and monitor vital signs, respiratory status, and labs (WBC).  Monitor for signs of aspiration (tachypnea, cough, rales, wheezing, cyanosis, fever).    - Assess and monitor patient's ability to swallow.  - Place patient up in chair to eat if possible.  - HOB up at 90 degrees to eat if unable to get patient up into chair.  - Supervise patient during oral intake.   - Instruct patient/ family to take small bites.  - Instruct patient/ family to take small single sips when taking liquids.  - Follow patient-specific strategies generated by speech pathologist.  Outcome: Progressing  Goal: Ventilated patient's risk of aspiration is minimized  Description: Assess and monitor vital signs, respiratory status, airway cuff pressure, and labs (WBC).  Monitor for signs of aspiration (tachypnea, cough, rales, wheezing, cyanosis, fever).    - Elevate head of bed 30 degrees if patient has tube feeding.  - Monitor tube feeding.  Outcome: Progressing

## 2025-01-23 NOTE — PLAN OF CARE
Problem: Prexisting or High Potential for Compromised Skin Integrity  Goal: Skin integrity is maintained or improved  Description: INTERVENTIONS:  - Identify patients at risk for skin breakdown  - Assess and monitor skin integrity  - Assess and monitor nutrition and hydration status  - Monitor labs   - Assess for incontinence   - Turn and reposition patient  - Assist with mobility/ambulation  - Relieve pressure over bony prominences  - Avoid friction and shearing  - Provide appropriate hygiene as needed including keeping skin clean and dry  - Evaluate need for skin moisturizer/barrier cream  - Collaborate with interdisciplinary team   - Patient/family teaching  - Consider wound care consult   Outcome: Progressing     Problem: Neurological Deficit  Goal: Neurological status is stable or improving  Description: Interventions:  - Monitor and assess patient's level of consciousness, motor function, sensory function, and level of assistance needed for ADLs.   - Monitor and report changes from baseline. Collaborate with interdisciplinary team to initiate plan and implement interventions as ordered.   - Provide and maintain a safe environment.  - Consider seizure precautions.  - Consider fall precautions.  - Consider aspiration precautions.  - Consider bleeding precautions.  Outcome: Progressing     Problem: Activity Intolerance/Impaired Mobility  Goal: Mobility/activity is maintained at optimum level for patient  Description: Interventions:  - Assess and monitor patient  barriers to mobility and need for assistive/adaptive devices.  - Assess patient's emotional response to limitations.  - Collaborate with interdisciplinary team and initiate plans and interventions as ordered.  - Encourage independent activity per ability.  - Maintain proper body alignment.  - Perform active/passive rom as tolerated/ordered.  - Plan activities to conserve energy.  - Turn patient as appropriate  Outcome: Progressing     Problem:  Communication Impairment  Goal: Ability to express needs and understand communication  Description: Assess patient's communication skills and ability to understand information.  Patient will demonstrate use of effective communication techniques, alternative methods of communication and understanding even if not able to speak.     - Encourage communication and provide alternate methods of communication as needed.  - Collaborate with case management/ for discharge needs.  - Include patient/family/caregiver in decisions related to communication.  Outcome: Progressing     Problem: Potential for Aspiration  Goal: Non-ventilated patient's risk of aspiration is minimized  Description: Assess and monitor vital signs, respiratory status, and labs (WBC).  Monitor for signs of aspiration (tachypnea, cough, rales, wheezing, cyanosis, fever).    - Assess and monitor patient's ability to swallow.  - Place patient up in chair to eat if possible.  - HOB up at 90 degrees to eat if unable to get patient up into chair.  - Supervise patient during oral intake.   - Instruct patient/ family to take small bites.  - Instruct patient/ family to take small single sips when taking liquids.  - Follow patient-specific strategies generated by speech pathologist.  Outcome: Progressing  Goal: Ventilated patient's risk of aspiration is minimized  Description: Assess and monitor vital signs, respiratory status, airway cuff pressure, and labs (WBC).  Monitor for signs of aspiration (tachypnea, cough, rales, wheezing, cyanosis, fever).    - Elevate head of bed 30 degrees if patient has tube feeding.  - Monitor tube feeding.  Outcome: Progressing     Problem: Nutrition  Goal: Nutrition/Hydration status is improving  Description: Monitor and assess patient's nutrition/hydration status for malnutrition (ex- brittle hair, bruises, dry skin, pale skin and conjunctiva, muscle wasting, smooth red tongue, and disorientation). Collaborate with  interdisciplinary team and initiate plan and interventions as ordered.  Monitor patient's weight and dietary intake as ordered or per policy. Utilize nutrition screening tool and intervene per policy. Determine patient's food preferences and provide high-protein, high-caloric foods as appropriate.     - Assist patient with eating.  - Allow adequate time for meals.  - Encourage patient to take dietary supplement as ordered.  - Collaborate with clinical nutritionist.  - Include patient/family/caregiver in decisions related to nutrition.  Outcome: Progressing     Problem: Nutrition/Hydration-ADULT  Goal: Nutrient/Hydration intake appropriate for improving, restoring or maintaining nutritional needs  Description: Monitor and assess patient's nutrition/hydration status for malnutrition. Collaborate with interdisciplinary team and initiate plan and interventions as ordered.  Monitor patient's weight and dietary intake as ordered or per policy. Utilize nutrition screening tool and intervene as necessary. Determine patient's food preferences and provide high-protein, high-caloric foods as appropriate.     INTERVENTIONS:  - Monitor oral intake, urinary output, labs, and treatment plans  - Assess nutrition and hydration status and recommend course of action  - Evaluate amount of meals eaten  - Assist patient with eating if necessary   - Allow adequate time for meals  - Recommend/ encourage appropriate diets, oral nutritional supplements, and vitamin/mineral supplements  - Order, calculate, and assess calorie counts as needed  - Recommend, monitor, and adjust tube feedings and TPN/PPN based on assessed needs  - Assess need for intravenous fluids  - Provide specific nutrition/hydration education as appropriate  - Include patient/family/caregiver in decisions related to nutrition  Outcome: Progressing

## 2025-01-23 NOTE — PROGRESS NOTES
"Progress Note - Hospitalist   Name: Ibeth Burrell 89 y.o. female I MRN: [id number]  Unit/Bed#: 2 E 267-01 I Date of Admission: 1/20/2025   Date of Service: 1/23/2025 I Hospital Day: 2    Assessment & Plan  Stroke-like symptoms  Patient presented to the ED as a prehospital stroke alert.   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  She presented with dizziness at home and near syncope where she almost fell but her family caught her. They reported her \"eyes rolled back\" prior to this.  NIHSS was 1 on arrival with residual right UE deficit noted.  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; no acute dissection or occlusion noted   MRI brain no new infarcts  PT, OT, SLP consulted - level 3 recs  Patient and daughter requesting rehab due to difficulties at home with independence   HTN (hypertension)  Resume home dose amlodipine   Monitor VS per stroke protocol   Blood Pressure: 143/74   MDD (major depressive disorder)  Continue fluoxetine   Hypokalemia  Mild K 3.4, s/p IV replacement   Chest pain  Central chest discomfort that stated after her near fall when her family members caught her  Likely MSK given very tender to palpation  CXR negative   Lidocaine patch ineffective, IV tylenol helping   Continue topical voltaren gel    VTE Pharmacologic Prophylaxis: VTE Score: 3 Moderate Risk (Score 3-4) - Pharmacological DVT Prophylaxis Ordered: enoxaparin (Lovenox).    Mobility:   Basic Mobility Inpatient Raw Score: 17  JH-HLM Goal: 5: Stand one or more mins  JH-HLM Achieved: 5: Stand (1 or more minutes)  JH-HLM Goal achieved. Continue to encourage appropriate mobility.    Patient Centered Rounds: I performed bedside rounds with nursing staff today.   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 Unable to contact.  not set up    Current Length of Stay: 2 day(s)  Current Patient Status: Inpatient   Certification Statement: The patient will continue to require additional inpatient hospital stay due to awaiting STR placement for safe d/c plan  Discharge Plan: Anticipate discharge later today or tomorrow to rehab facility.    Code Status: Level 1 - Full Code    Subjective   Patient reports she is missing some of her vitamins and wondering if they can be ordered. We discussed having restarted her fluoxetine yesterday as well. Otherwise, continues to have sternal pain that is worse with movement and palpation, but does improve with tylenol.    Objective :  HR:  [59] 59  BP: (143-147)/(73-74) 143/74  SpO2:  [94 %-96 %] 96 %  O2 Device: None (Room air)    Body mass index is 24.79 kg/m².     Input and Output Summary (last 24 hours):     Intake/Output Summary (Last 24 hours) at 1/23/2025 0801  Last data filed at 1/23/2025 0601  Gross per 24 hour   Intake 400 ml   Output 725 ml   Net -325 ml       Physical Exam  Vitals and nursing note reviewed.   Constitutional:       General: She is awake. She is not in acute distress.     Appearance: Normal appearance. She is well-developed and well-groomed. She is not ill-appearing or toxic-appearing.   Cardiovascular:      Rate and Rhythm: Normal rate.   Pulmonary:      Effort: Pulmonary effort is normal. No respiratory distress.   Skin:     Coloration: Skin is not pale.   Neurological:      Mental Status: She is alert and oriented to person, place, and time. Mental status is at baseline.      Cranial Nerves: No cranial nerve deficit.      Motor: Weakness (RUE, stable at baseline) present.   Psychiatric:         Mood and Affect: Mood normal.         Behavior: Behavior normal. Behavior is cooperative.                Lab Results: I have reviewed the following results:   Results from last 7 days   Lab Units  01/21/25  0513   WBC Thousand/uL 7.45   HEMOGLOBIN g/dL 12.0   HEMATOCRIT % 35.8   PLATELETS Thousands/uL 183   SEGS PCT % 70   LYMPHO PCT % 18   MONO PCT % 9   EOS PCT % 3     Results from last 7 days   Lab Units 01/21/25  0513   SODIUM mmol/L 141   POTASSIUM mmol/L 3.5   CHLORIDE mmol/L 109*   CO2 mmol/L 26   BUN mg/dL 14   CREATININE mg/dL 0.42*   ANION GAP mmol/L 6   CALCIUM mg/dL 8.7   GLUCOSE RANDOM mg/dL 98     Results from last 7 days   Lab Units 01/20/25  1830   INR  1.00     Results from last 7 days   Lab Units 01/20/25  1816   POC GLUCOSE mg/dl 115     Results from last 7 days   Lab Units 01/21/25  0513   HEMOGLOBIN A1C % 5.4           Recent Cultures (last 7 days):         Imaging Results Review: No pertinent imaging studies reviewed.  Other Study Results Review: No additional pertinent studies reviewed.    Last 24 Hours Medication List:     Current Facility-Administered Medications:     acetaminophen (Ofirmev) injection 1,000 mg, Q6H PRN, Last Rate: 1,000 mg (01/22/25 0856)    acetaminophen (TYLENOL) tablet 650 mg, Q6H PRN    amLODIPine (NORVASC) tablet 5 mg, Daily    atorvastatin (LIPITOR) tablet 40 mg, Daily    clopidogrel (PLAVIX) tablet 75 mg, Daily    Diclofenac Sodium (VOLTAREN) 1 % topical gel 2 g, 4x Daily    enoxaparin (LOVENOX) subcutaneous injection 40 mg, Daily    FLUoxetine (PROzac) capsule 10 mg, Daily    iohexol (OMNIPAQUE) 350 MG/ML injection (MULTI-DOSE) 85 mL, Once in imaging    meclizine (ANTIVERT) tablet 25 mg, Q8H PRN    multivitamin stress formula tablet 1 tablet, Daily    ondansetron (ZOFRAN) injection 4 mg, Q4H PRN    polyethylene glycol (MIRALAX) packet 17 g, Daily PRN    Administrative Statements   Today, Patient Was Seen By: Nika Gallego PA-C  I have spent a total time of 40 minutes in caring for this patient on the day of the visit/encounter including Counseling / Coordination of care, Documenting in the medical record, Reviewing / ordering tests, medicine, procedures  ,  and Communicating with other healthcare professionals .    **Please Note: This note may have been constructed using a voice recognition system.**

## 2025-01-23 NOTE — ASSESSMENT & PLAN NOTE
"Patient presented to the ED as a prehospital stroke alert.   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  She presented with dizziness at home and near syncope where she almost fell but her family caught her. They reported her \"eyes rolled back\" prior to this.  NIHSS was 1 on arrival with residual right UE deficit noted.  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; no acute dissection or occlusion noted   MRI brain no new infarcts  PT, OT, SLP consulted - level 3 recs  Patient and daughter requesting rehab due to difficulties at home with independence   "

## 2025-01-24 VITALS
DIASTOLIC BLOOD PRESSURE: 77 MMHG | TEMPERATURE: 97.7 F | OXYGEN SATURATION: 94 % | HEART RATE: 57 BPM | HEIGHT: 65 IN | WEIGHT: 149 LBS | SYSTOLIC BLOOD PRESSURE: 143 MMHG | BODY MASS INDEX: 24.83 KG/M2 | RESPIRATION RATE: 18 BRPM

## 2025-01-24 PROBLEM — E87.6 HYPOKALEMIA: Status: RESOLVED | Noted: 2025-01-20 | Resolved: 2025-01-24

## 2025-01-24 PROCEDURE — 99239 HOSP IP/OBS DSCHRG MGMT >30: CPT | Performed by: PHYSICIAN ASSISTANT

## 2025-01-24 PROCEDURE — NC001 PR NO CHARGE: Performed by: PHYSICIAN ASSISTANT

## 2025-01-24 RX ORDER — OXYCODONE HYDROCHLORIDE 5 MG/1
2.5 TABLET ORAL EVERY 6 HOURS PRN
Qty: 10 TABLET | Refills: 0 | Status: SHIPPED | OUTPATIENT
Start: 2025-01-24 | End: 2025-02-03

## 2025-01-24 RX ORDER — ACETAMINOPHEN 325 MG/1
975 TABLET ORAL EVERY 8 HOURS SCHEDULED
Start: 2025-01-24

## 2025-01-24 RX ORDER — BISACODYL 5 MG/1
5 TABLET, DELAYED RELEASE ORAL ONCE
Status: COMPLETED | OUTPATIENT
Start: 2025-01-24 | End: 2025-01-24

## 2025-01-24 RX ORDER — MECLIZINE HYDROCHLORIDE 25 MG/1
25 TABLET ORAL EVERY 8 HOURS PRN
Start: 2025-01-24

## 2025-01-24 RX ORDER — AMOXICILLIN 250 MG
1 CAPSULE ORAL 2 TIMES DAILY
Status: DISCONTINUED | OUTPATIENT
Start: 2025-01-24 | End: 2025-01-24 | Stop reason: HOSPADM

## 2025-01-24 RX ORDER — POLYETHYLENE GLYCOL 3350 17 G/17G
17 POWDER, FOR SOLUTION ORAL DAILY PRN
Start: 2025-01-24

## 2025-01-24 RX ORDER — AMOXICILLIN 250 MG
1 CAPSULE ORAL 2 TIMES DAILY
Start: 2025-01-24

## 2025-01-24 RX ADMIN — AMLODIPINE BESYLATE 5 MG: 5 TABLET ORAL at 09:08

## 2025-01-24 RX ADMIN — SENNOSIDES AND DOCUSATE SODIUM 1 TABLET: 50; 8.6 TABLET ORAL at 09:08

## 2025-01-24 RX ADMIN — B-COMPLEX W/ C & FOLIC ACID TAB 1 TABLET: TAB at 09:08

## 2025-01-24 RX ADMIN — ATORVASTATIN CALCIUM 40 MG: 40 TABLET, FILM COATED ORAL at 09:08

## 2025-01-24 RX ADMIN — DICLOFENAC SODIUM 2 G: 10 GEL TOPICAL at 09:09

## 2025-01-24 RX ADMIN — BISACODYL 5 MG: 5 TABLET, COATED ORAL at 09:08

## 2025-01-24 RX ADMIN — ACETAMINOPHEN 975 MG: 325 TABLET, FILM COATED ORAL at 05:35

## 2025-01-24 RX ADMIN — CLOPIDOGREL 75 MG: 75 TABLET ORAL at 09:08

## 2025-01-24 RX ADMIN — DICLOFENAC SODIUM 2 G: 10 GEL TOPICAL at 12:22

## 2025-01-24 RX ADMIN — FLUOXETINE HYDROCHLORIDE 10 MG: 10 CAPSULE ORAL at 09:09

## 2025-01-24 RX ADMIN — ENOXAPARIN SODIUM 40 MG: 40 INJECTION SUBCUTANEOUS at 09:08

## 2025-01-24 NOTE — DISCHARGE SUMMARY
"Discharge Summary - Hospitalist   Name: Ibeth Burrell 89 y.o. female I MRN: [id number]  Unit/Bed#: 2 E 267-01 I Date of Admission: 1/20/2025   Date of Service: 1/24/2025 I Hospital Day: 3     Assessment & Plan  Stroke-like symptoms  Patient presented to the ED as a prehospital stroke alert.   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  She presented with dizziness at home and near syncope where she almost fell but her family caught her. They reported her \"eyes rolled back\" prior to this.  NIHSS was 1 on arrival with residual right UE deficit noted.  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; no acute dissection or occlusion noted   MRI brain no new infarcts  PT, OT, SLP consulted - level 3 recs  Patient and daughter requesting rehab due to difficulties at home with independence   HTN (hypertension)  Resume home dose amlodipine   Monitor VS per stroke protocol   Blood Pressure: 143/77   MDD (major depressive disorder)  Continue fluoxetine   Chest pain  Central chest discomfort that stated after her near fall when her family members caught her  MSK given very tender to palpation  CXR negative   Lidocaine patch ineffective, continue scheduled tylenol, voltaren gel and low dose oxycodone       Discharging Physician / Practitioner: Nika Gallego PA-C  PCP: No primary care provider on file.  Admission Date:   Admission Orders (From admission, onward)       Ordered        01/21/25 1447  INPATIENT ADMISSION  Once            01/20/25 1925  Place in Observation  Once                          Discharge Date: 01/24/25    Disposition:    Rehab - [facility]     Discharge Diagnoses:   Please see assessment and plan section above for further details regarding discharge diagnoses.     Consultations During Hospital Stay:  IP CONSULT TO NEUROLOGY    Procedures Performed:   None     Significant Findings / Test Results:   XR chest " portable  Result Date: 1/23/2025  Impression: No interval change from 1/20/2025 Workstation performed: [id number]     MRI brain wo contrast  Result Date: 1/21/2025  Impression: White matter changes suggestive of chronic microangiopathy. Scattered remote lacunar infarcts are also noted. No acute intracranial pathology. Workstation performed: [id number]     XR chest portable  Result Date: 1/21/2025  Impression: Mild right base opacity which could be due to atelectasis. Pneumonia not excluded in the appropriate clinical setting. Workstation performed: [id number]     CTA stroke alert (head/neck)  Result Date: 1/20/2025  Impression: Moderate to high-grade stenosis involving the right posterior artery P2 segment, and mild stenosis of the P2 segment, mild stenosis of the distal right M1 segment and mild stenosis of a left middle cerebral artery proximal M2 segment due to atherosclerotic plaque. No large vessel occlusion or intracranial aneurysm identified. No hemodynamically significant stenosis, dissection or occlusion identified on CT angiogram of the neck. Findings were directly discussed with Simone Harrington at 6:17 p.m. on 1/20/2025.. Workstation performed: [id number]     CT stroke alert brain  Result Date: 1/20/2025  Impression: No acute intracranial abnormality. Moderate chronic white matter microangiopathic changes. Old lacunar infarcts as described above. Findings were directly discussed with Simone Harrington at approximately 6:10 p.m. on 1/20/2025. Workstation performed: [id number]       XR chest portable  Result Date: 1/23/2025  Impression No interval change from 1/20/2025 Workstation performed: [id number]        Results for orders placed during the hospital encounter of 01/20/25    Echo complete w/ contrast if indicated    Interpretation Summary    Technically difficult study    Left Ventricle: Left ventricular cavity size is normal. Wall thickness is not well visualized. The left ventricular ejection fraction is  "60% by visual estimation. Systolic function is normal. Although no diagnostic regional wall motion abnormality was identified, this possibility cannot be completely excluded on the basis of this study. There is  outflow tract dynamic obstruction at rest with a peak gradient of 9.0 mmHg which is secondary to septal prominence in conjunction with calcification of the mitral leaflet tips    Left Atrium: The atrium is dilated.    Right Atrium: The atrium is dilated.    Aortic Valve: There is mild to moderate regurgitation. There is mild stenosis. The aortic valve mean gradient is 18 mmHg. The dimensionless velocity index is 0.59.    Mitral Valve: There is moderate focal thickening of the anterior leaflet. There is annular calcification. There is mild regurgitation. There is mild stenosis. The mitral valve mean gradient is 3mmHg.    Aorta: The aortic root is 3.90 cm. The ascending aorta is 4.2 cm.  Which is dilated      No results for input(s): \"BLOODCX\", \"SPUTUMCULTUR\", \"GRAMSTAIN\", \"URINECX\", \"WOUNDCULT\", \"BODYFLUIDCUL\", \"MRSACULTURE\", \"INFLUAPCR\", \"INFLUBPCR\", \"RSVPCR\", \"LEGIONELLAUR\", \"STPU\", \"CDIFFTOXINB\" in the last 72 hours.    Incidental Findings:   None other than noted above. I reviewed the above mentioned incidental findings with the patient and/or family and they expressed understanding    Test Results Pending at Discharge (will require follow up):   None      Outpatient Tests Requested:  None     Complications:  chest pain    Reason for Admission:   Chief Complaint   Patient presents with    STROKE Alert     Pt arrives with EMS with reports of weakness, vomiting, chest pain, and diarrhea. Per patient this is how her previous strokes have presented. LKW 1630 today        Hospital Course:    Ibeth Burrell is a 89 y.o. female with a PMH of CVA on plavix, HTN, anxiety/MDD who presents with dizziness and near-syncopal episode at home. Patient was in her usual state of health with no recent illness. She " attempted to ambulate in the kitchen when her daughter noticed her eyes rolled back and she caught her before she fell. She complained of reproducible chest pain since.    Stroke has been ruled out. Her dizziness is resolved. Suspect either hypotensive episode of low flow state in the setting of significant vascular disease.     The imaging of her chest shows no fractures, but she continues to have MSK pain. This is improving with current pain regimen and she is aware this will take some time to resolve. Continue bowel regimen.      Condition at Discharge: fair    Discharge Day Visit / Exam:   Discussion with Family: daughter by phone this am     Medication Adjustments and Discharge Medications:  Discharge Medication List: See after visit summary for reconciled discharge medications.   Medication Dosing Tapers - Please refer to Discharge Medication List for details on any medication dosing tapers (if applicable to patient).   Summary of Medication Adjustments made as a result of this hospitalization: as noted on med rec  Medications being temporarily held (include recommended restart time): as noted on med rec    Wound Care Recommendations:  When applicable, please see wound care section of After Visit Summary.    Instructions for any Catheters / Lines Present at Discharge (including removal date, if applicable): n/a    Diet Recommendations at Discharge:  Diet -        Diet Orders   (From admission, onward)                 Start     Ordered    01/21/25 1056  Diet Regular; Regular House  Diet effective now        References:    Adult Nutrition Support Algorithm    RD Therapeutic Diet Order Protocol   Question Answer Comment   Diet Type Regular    Regular Regular House    RD to adjust diet per protocol? Yes        01/21/25 1055                    Mobility at time of Discharge:   Basic Mobility Inpatient Raw Score: 17  JH-HLM Goal: 5: Stand one or more mins  JH-HLM Achieved: 6: Walk 10 steps or more  HLM Goal achieved.  Continue to encourage appropriate mobility.    Goals of Care Discussions:  Code Status at Discharge: Level 1 - Full Code  Goals of care were not discussed during this admission.    Discharge instructions/Information to patient and family:   See after visit summary section titled Discharge Instructions for information provided to patient and family.      Planned Readmission: none      Discharge Statement:  I spent 50 minutes discharging the patient. This time was spent on the day of discharge. I had direct contact with the patient on the day of discharge. Greater than 50% of the total time was spent examining patient, answering all patient questions, arranging and discussing plan of care with patient as well as directly providing post-discharge instructions.  Additional time then spent on discharge activities.    **Please Note: This note may have been constructed using a voice recognition system.**

## 2025-01-24 NOTE — ASSESSMENT & PLAN NOTE
Central chest discomfort that stated after her near fall when her family members caught her  MSK given very tender to palpation  CXR negative   Lidocaine patch ineffective, continue scheduled tylenol, voltaren gel and low dose oxycodone

## 2025-01-24 NOTE — PROGRESS NOTES
"Progress Note - Hospitalist   Name: Ibeth Burrell 89 y.o. female I MRN: [id number]  Unit/Bed#: 2 E 267-01 I Date of Admission: 1/20/2025   Date of Service: 1/24/2025 I Hospital Day: 3    Assessment & Plan  Stroke-like symptoms  Patient presented to the ED as a prehospital stroke alert.   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  She presented with dizziness at home and near syncope where she almost fell but her family caught her. They reported her \"eyes rolled back\" prior to this.  NIHSS was 1 on arrival with residual right UE deficit noted.  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; no acute dissection or occlusion noted   MRI brain no new infarcts  PT, OT, SLP consulted - level 3 recs  Patient and daughter requesting rehab due to difficulties at home with independence   HTN (hypertension)  Resume home dose amlodipine   Monitor VS per stroke protocol   Blood Pressure: 143/77   MDD (major depressive disorder)  Continue fluoxetine   Hypokalemia (Resolved: 1/24/2025)  Mild K 3.4, s/p IV replacement   Mg 1.4, s/p IV replacement   Chest pain  Central chest discomfort that stated after her near fall when her family members caught her  MSK given very tender to palpation  CXR negative   Lidocaine patch ineffective, continue scheduled tylenol, voltaren gel and low dose oxycodone    VTE Pharmacologic Prophylaxis: VTE Score: 3 Moderate Risk (Score 3-4) - Pharmacological DVT Prophylaxis Ordered: enoxaparin (Lovenox).    Mobility:   Basic Mobility Inpatient Raw Score: 17  JH-HLM Goal: 5: Stand one or more mins  JH-HLM Achieved: 6: Walk 10 steps or more  JH-HLM Goal achieved. Continue to encourage appropriate mobility.    Patient Centered Rounds: I performed bedside rounds with nursing staff today.   Discussions with Specialists or Other Care Team Provider: CM    Education and Discussions with Family / Patient: Updated  " (daughter @ 0640 am) via phone.    Current Length of Stay: 3 day(s)  Current Patient Status: Inpatient   Certification Statement: The patient will continue to require additional inpatient hospital stay due to awaiting Goltry and pain control  Discharge Plan: Anticipate discharge later today or tomorrow to rehab facility.    Code Status: Level 1 - Full Code    Subjective   Patient reports overall she is doing well. Her chest still hurts when she moves or coughs, but her back no longer hurts. She would like cough drops to help calm that down. She would also like the flu shot before leaving the hospital. No fevers or chills. Discussed stroke workup and negative findings, but significant vascular disease.     Objective :  Temp:  [97.7 °F (36.5 °C)-98.1 °F (36.7 °C)] 97.7 °F (36.5 °C)  HR:  [57-69] 57  BP: (119-168)/(62-83) 143/77  Resp:  [18] 18  SpO2:  [93 %-94 %] 94 %  O2 Device: None (Room air)    Body mass index is 24.79 kg/m².     Input and Output Summary (last 24 hours):     Intake/Output Summary (Last 24 hours) at 1/24/2025 0746  Last data filed at 1/23/2025 1119  Gross per 24 hour   Intake --   Output 400 ml   Net -400 ml       Physical Exam  Vitals and nursing note reviewed.   Constitutional:       General: She is awake. She is not in acute distress.     Appearance: Normal appearance. She is well-developed, well-groomed and normal weight. She is not ill-appearing or toxic-appearing.   HENT:      Head: Normocephalic and atraumatic.   Eyes:      General: No scleral icterus.  Cardiovascular:      Rate and Rhythm: Normal rate.   Pulmonary:      Effort: Pulmonary effort is normal. No respiratory distress.   Chest:      Chest wall: Tenderness (left sternal border, swollen and TTP) present.   Musculoskeletal:      Right lower leg: No edema.      Left lower leg: No edema.   Skin:     General: Skin is warm and dry.      Coloration: Skin is not jaundiced or pale.   Neurological:      Mental Status: She is alert and  oriented to person, place, and time. Mental status is at baseline.      Motor: Weakness (RUE, baseline) present.   Psychiatric:         Mood and Affect: Mood normal.         Behavior: Behavior normal. Behavior is cooperative.         Thought Content: Thought content normal.         Judgment: Judgment normal.            Lines/Drains:              Lab Results: I have reviewed the following results:   Results from last 7 days   Lab Units 01/21/25  0513   WBC Thousand/uL 7.45   HEMOGLOBIN g/dL 12.0   HEMATOCRIT % 35.8   PLATELETS Thousands/uL 183   SEGS PCT % 70   LYMPHO PCT % 18   MONO PCT % 9   EOS PCT % 3     Results from last 7 days   Lab Units 01/21/25  0513   SODIUM mmol/L 141   POTASSIUM mmol/L 3.5   CHLORIDE mmol/L 109*   CO2 mmol/L 26   BUN mg/dL 14   CREATININE mg/dL 0.42*   ANION GAP mmol/L 6   CALCIUM mg/dL 8.7   GLUCOSE RANDOM mg/dL 98     Results from last 7 days   Lab Units 01/20/25  1830   INR  1.00     Results from last 7 days   Lab Units 01/20/25  1816   POC GLUCOSE mg/dl 115     Results from last 7 days   Lab Units 01/21/25  0513   HEMOGLOBIN A1C % 5.4           Recent Cultures (last 7 days):         Imaging Results Review: I reviewed radiology reports from this admission including: chest xray.  Other Study Results Review: No additional pertinent studies reviewed.    Last 24 Hours Medication List:     Current Facility-Administered Medications:     acetaminophen (TYLENOL) tablet 650 mg, BID PRN    acetaminophen (TYLENOL) tablet 975 mg, Q8H RAUL    amLODIPine (NORVASC) tablet 5 mg, Daily    atorvastatin (LIPITOR) tablet 40 mg, Daily    clopidogrel (PLAVIX) tablet 75 mg, Daily    Diclofenac Sodium (VOLTAREN) 1 % topical gel 2 g, 4x Daily    enoxaparin (LOVENOX) subcutaneous injection 40 mg, Daily    FLUoxetine (PROzac) capsule 10 mg, Daily    iohexol (OMNIPAQUE) 350 MG/ML injection (MULTI-DOSE) 85 mL, Once in imaging    meclizine (ANTIVERT) tablet 25 mg, Q8H PRN    multivitamin stress formula tablet 1  tablet, Daily    ondansetron (ZOFRAN) injection 4 mg, Q4H PRN    oxyCODONE (ROXICODONE) split tablet 2.5 mg, Q6H PRN    polyethylene glycol (MIRALAX) packet 17 g, Daily PRN    Administrative Statements   Today, Patient Was Seen By: Nika Gallego PA-C  I have spent a total time of 45 minutes in caring for this patient on the day of the visit/encounter including Instructions for management, Patient and family education, Counseling / Coordination of care, Documenting in the medical record, Reviewing / ordering tests, medicine, procedures  , and Communicating with other healthcare professionals .    **Please Note: This note may have been constructed using a voice recognition system.**

## 2025-01-24 NOTE — PLAN OF CARE
Problem: Prexisting or High Potential for Compromised Skin Integrity  Goal: Skin integrity is maintained or improved  Description: INTERVENTIONS:  - Identify patients at risk for skin breakdown  - Assess and monitor skin integrity  - Assess and monitor nutrition and hydration status  - Monitor labs   - Assess for incontinence   - Turn and reposition patient  - Assist with mobility/ambulation  - Relieve pressure over bony prominences  - Avoid friction and shearing  - Provide appropriate hygiene as needed including keeping skin clean and dry  - Evaluate need for skin moisturizer/barrier cream  - Collaborate with interdisciplinary team   - Patient/family teaching  - Consider wound care consult   Outcome: Progressing     Problem: Neurological Deficit  Goal: Neurological status is stable or improving  Description: Interventions:  - Monitor and assess patient's level of consciousness, motor function, sensory function, and level of assistance needed for ADLs.   - Monitor and report changes from baseline. Collaborate with interdisciplinary team to initiate plan and implement interventions as ordered.   - Provide and maintain a safe environment.  - Consider seizure precautions.  - Consider fall precautions.  - Consider aspiration precautions.  - Consider bleeding precautions.  Outcome: Progressing     Problem: Activity Intolerance/Impaired Mobility  Goal: Mobility/activity is maintained at optimum level for patient  Description: Interventions:  - Assess and monitor patient  barriers to mobility and need for assistive/adaptive devices.  - Assess patient's emotional response to limitations.  - Collaborate with interdisciplinary team and initiate plans and interventions as ordered.  - Encourage independent activity per ability.  - Maintain proper body alignment.  - Perform active/passive rom as tolerated/ordered.  - Plan activities to conserve energy.  - Turn patient as appropriate  Outcome: Progressing     Problem:  Communication Impairment  Goal: Ability to express needs and understand communication  Description: Assess patient's communication skills and ability to understand information.  Patient will demonstrate use of effective communication techniques, alternative methods of communication and understanding even if not able to speak.     - Encourage communication and provide alternate methods of communication as needed.  - Collaborate with case management/ for discharge needs.  - Include patient/family/caregiver in decisions related to communication.  Outcome: Progressing     Problem: Potential for Aspiration  Goal: Non-ventilated patient's risk of aspiration is minimized  Description: Assess and monitor vital signs, respiratory status, and labs (WBC).  Monitor for signs of aspiration (tachypnea, cough, rales, wheezing, cyanosis, fever).    - Assess and monitor patient's ability to swallow.  - Place patient up in chair to eat if possible.  - HOB up at 90 degrees to eat if unable to get patient up into chair.  - Supervise patient during oral intake.   - Instruct patient/ family to take small bites.  - Instruct patient/ family to take small single sips when taking liquids.  - Follow patient-specific strategies generated by speech pathologist.  Outcome: Progressing  Goal: Ventilated patient's risk of aspiration is minimized  Description: Assess and monitor vital signs, respiratory status, airway cuff pressure, and labs (WBC).  Monitor for signs of aspiration (tachypnea, cough, rales, wheezing, cyanosis, fever).    - Elevate head of bed 30 degrees if patient has tube feeding.  - Monitor tube feeding.  Outcome: Progressing     Problem: Nutrition/Hydration-ADULT  Goal: Nutrient/Hydration intake appropriate for improving, restoring or maintaining nutritional needs  Description: Monitor and assess patient's nutrition/hydration status for malnutrition. Collaborate with interdisciplinary team and initiate plan and  interventions as ordered.  Monitor patient's weight and dietary intake as ordered or per policy. Utilize nutrition screening tool and intervene as necessary. Determine patient's food preferences and provide high-protein, high-caloric foods as appropriate.     INTERVENTIONS:  - Monitor oral intake, urinary output, labs, and treatment plans  - Assess nutrition and hydration status and recommend course of action  - Evaluate amount of meals eaten  - Assist patient with eating if necessary   - Allow adequate time for meals  - Recommend/ encourage appropriate diets, oral nutritional supplements, and vitamin/mineral supplements  - Order, calculate, and assess calorie counts as needed  - Recommend, monitor, and adjust tube feedings and TPN/PPN based on assessed needs  - Assess need for intravenous fluids  - Provide specific nutrition/hydration education as appropriate  - Include patient/family/caregiver in decisions related to nutrition  Outcome: Progressing

## 2025-01-24 NOTE — HOSPITAL COURSE
Ibeth Burrell is a 89 y.o. female with a PMH of CVA on plavix, HTN, anxiety/MDD who presents with dizziness and near-syncopal episode at home. Patient was in her usual state of health with no recent illness. She attempted to ambulate in the kitchen when her daughter noticed her eyes rolled back and she caught her before she fell. She complained of reproducible chest pain since.    Stroke has been ruled out. Her dizziness is resolved. Suspect either hypotensive episode of low flow state in the setting of significant vascular disease.     The imaging of her chest shows no fractures, but she continues to have MSK pain. This is improving with current pain regimen and she is aware this will take some time to resolve. Continue bowel regimen.

## 2025-01-24 NOTE — CASE MANAGEMENT
Case Management Discharge Planning Note    Patient name Ibeth Burrell  Location 2 EAST 267/2 E 267-01 MRN [id number]  : 1935 Date 2025       Current Admission Date: 2025  Current Admission Diagnosis:Stroke-like symptoms   Patient Active Problem List    Diagnosis Date Noted Date Diagnosed    Stroke-like symptoms 2025     HTN (hypertension) 2025     MDD (major depressive disorder) 2025     Chest pain 2025       LOS (days): 3  Geometric Mean LOS (GMLOS) (days): 1.9  Days to GMLOS:-0.9     OBJECTIVE:  Risk of Unplanned Readmission Score: 10.02         Current admission status: Inpatient   Preferred Pharmacy:   [id number] - [place] - [address]  [address]  [place] [address]  Phone: [phone] Fax: [phone]    Primary Care Provider: No primary care provider on file.    Primary Insurance: MEDICARE  Secondary Insurance:     DISCHARGE DETAILS:    Other Referral/Resources/Interventions Provided:  Referral Comments: [facility] able to accept today.    Transport at Discharge : Wheelchair van    Transported by (Company and Unit #): [facility]  ETA of Transport (Date): 25  ETA of Transport (Time): 1330     Additional Comments: CM met with pt at bedside and informed she is medically stable for discharge to [facility] today.    Accepting Facility Name, City & State : [facility]  Receiving Facility/Agency Phone Number: Phone: [phone]  Facility/Agency Fax Number: Fax: [phone]     ADDENDUM 12:58pm: CM spoke with pt's daughter via phone call and informed of discharge to [facility] today at 1:30pm. Lorelei agreeable with plan.